# Patient Record
Sex: MALE | Race: WHITE | NOT HISPANIC OR LATINO | Employment: FULL TIME | URBAN - METROPOLITAN AREA
[De-identification: names, ages, dates, MRNs, and addresses within clinical notes are randomized per-mention and may not be internally consistent; named-entity substitution may affect disease eponyms.]

---

## 2018-04-15 ENCOUNTER — HOSPITAL ENCOUNTER (EMERGENCY)
Facility: HOSPITAL | Age: 23
Discharge: HOME | End: 2018-04-15
Attending: EMERGENCY MEDICINE | Admitting: EMERGENCY MEDICINE

## 2018-04-15 VITALS
BODY MASS INDEX: 25.18 KG/M2 | HEIGHT: 69 IN | SYSTOLIC BLOOD PRESSURE: 137 MMHG | WEIGHT: 170 LBS | HEART RATE: 70 BPM | RESPIRATION RATE: 20 BRPM | TEMPERATURE: 96.2 F | OXYGEN SATURATION: 97 % | DIASTOLIC BLOOD PRESSURE: 91 MMHG

## 2018-04-15 DIAGNOSIS — F10.10 ALCOHOL ABUSE: Primary | ICD-10-CM

## 2018-04-15 LAB
ALBUMIN SERPL BCP-MCNC: 4.5 G/DL (ref 3.5–5)
ALP SERPL-CCNC: 69 U/L (ref 46–116)
ALT SERPL W P-5'-P-CCNC: 30 U/L (ref 12–78)
ANION GAP SERPL CALCULATED.3IONS-SCNC: 13 MMOL/L (ref 4–13)
AST SERPL W P-5'-P-CCNC: 22 U/L (ref 5–45)
BASOPHILS # BLD AUTO: 0.1 THOUSANDS/ΜL (ref 0–0.1)
BASOPHILS NFR BLD AUTO: 0 % (ref 0–1)
BILIRUB SERPL-MCNC: 0.4 MG/DL (ref 0.2–1)
BUN SERPL-MCNC: 14 MG/DL (ref 5–25)
CALCIUM SERPL-MCNC: 9.1 MG/DL
CHLORIDE SERPL-SCNC: 103 MMOL/L (ref 100–108)
CO2 SERPL-SCNC: 24 MMOL/L (ref 21–32)
CREAT SERPL-MCNC: 0.98 MG/DL (ref 0.6–1.3)
EOSINOPHIL # BLD AUTO: 0 THOUSAND/ΜL (ref 0–0.61)
EOSINOPHIL NFR BLD AUTO: 0 % (ref 0–6)
ERYTHROCYTE [DISTWIDTH] IN BLOOD BY AUTOMATED COUNT: 13.6 % (ref 11.6–15.1)
GFR SERPL CREATININE-BSD FRML MDRD: 107 ML/MIN/1.73SQ M
GLUCOSE SERPL-MCNC: 126 MG/DL (ref 65–140)
HCT VFR BLD AUTO: 47 % (ref 42–52)
HGB BLD-MCNC: 15.4 G/DL (ref 14–18)
LIPASE SERPL-CCNC: 72 U/L (ref 73–393)
LYMPHOCYTES # BLD AUTO: 2.2 THOUSANDS/ΜL (ref 0.6–4.47)
LYMPHOCYTES NFR BLD AUTO: 12 % (ref 14–44)
MCH RBC QN AUTO: 31.6 PG (ref 27–31)
MCHC RBC AUTO-ENTMCNC: 32.8 G/DL (ref 31.4–37.4)
MCV RBC AUTO: 96 FL (ref 82–98)
MONOCYTES # BLD AUTO: 1 THOUSAND/ΜL (ref 0.17–1.22)
MONOCYTES NFR BLD AUTO: 5 % (ref 4–12)
NEUTROPHILS # BLD AUTO: 16 THOUSANDS/ΜL (ref 1.85–7.62)
NEUTS SEG NFR BLD AUTO: 83 % (ref 43–75)
NRBC BLD AUTO-RTO: 0 /100 WBCS
PLATELET # BLD AUTO: 270 THOUSANDS/UL (ref 130–400)
PLATELET BLD QL SMEAR: ADEQUATE
PMV BLD AUTO: 8.8 FL (ref 8.9–12.7)
POTASSIUM SERPL-SCNC: 3.7 MMOL/L (ref 3.5–5.3)
PROT SERPL-MCNC: 7.5 G/DL (ref 6.4–8.2)
RBC # BLD AUTO: 4.88 MILLION/UL (ref 4.7–6.1)
SODIUM SERPL-SCNC: 140 MMOL/L (ref 136–145)
WBC # BLD AUTO: 19.4 THOUSAND/UL (ref 4.8–10.8)

## 2018-04-15 PROCEDURE — 96366 THER/PROPH/DIAG IV INF ADDON: CPT

## 2018-04-15 PROCEDURE — 83690 ASSAY OF LIPASE: CPT | Performed by: PHYSICIAN ASSISTANT

## 2018-04-15 PROCEDURE — C9113 INJ PANTOPRAZOLE SODIUM, VIA: HCPCS | Performed by: EMERGENCY MEDICINE

## 2018-04-15 PROCEDURE — 96361 HYDRATE IV INFUSION ADD-ON: CPT

## 2018-04-15 PROCEDURE — 80053 COMPREHEN METABOLIC PANEL: CPT | Performed by: PHYSICIAN ASSISTANT

## 2018-04-15 PROCEDURE — 96365 THER/PROPH/DIAG IV INF INIT: CPT

## 2018-04-15 PROCEDURE — 96375 TX/PRO/DX INJ NEW DRUG ADDON: CPT

## 2018-04-15 PROCEDURE — 36415 COLL VENOUS BLD VENIPUNCTURE: CPT | Performed by: PHYSICIAN ASSISTANT

## 2018-04-15 PROCEDURE — 85025 COMPLETE CBC W/AUTO DIFF WBC: CPT | Performed by: PHYSICIAN ASSISTANT

## 2018-04-15 PROCEDURE — 99283 EMERGENCY DEPT VISIT LOW MDM: CPT

## 2018-04-15 PROCEDURE — 96368 THER/DIAG CONCURRENT INF: CPT

## 2018-04-15 RX ORDER — ONDANSETRON 2 MG/ML
4 INJECTION INTRAMUSCULAR; INTRAVENOUS ONCE
Status: COMPLETED | OUTPATIENT
Start: 2018-04-15 | End: 2018-04-15

## 2018-04-15 RX ORDER — ONDANSETRON 4 MG/1
4 TABLET, ORALLY DISINTEGRATING ORAL EVERY 6 HOURS PRN
Qty: 20 TABLET | Refills: 0 | Status: SHIPPED | OUTPATIENT
Start: 2018-04-15 | End: 2018-06-02

## 2018-04-15 RX ORDER — MAGNESIUM SULFATE HEPTAHYDRATE 40 MG/ML
2 INJECTION, SOLUTION INTRAVENOUS ONCE
Status: COMPLETED | OUTPATIENT
Start: 2018-04-15 | End: 2018-04-15

## 2018-04-15 RX ORDER — PANTOPRAZOLE SODIUM 40 MG/1
40 INJECTION, POWDER, FOR SOLUTION INTRAVENOUS ONCE
Status: COMPLETED | OUTPATIENT
Start: 2018-04-15 | End: 2018-04-15

## 2018-04-15 RX ORDER — KETOROLAC TROMETHAMINE 30 MG/ML
30 INJECTION, SOLUTION INTRAMUSCULAR; INTRAVENOUS ONCE
Status: COMPLETED | OUTPATIENT
Start: 2018-04-15 | End: 2018-04-15

## 2018-04-15 RX ADMIN — SODIUM CHLORIDE 1000 ML: 0.9 INJECTION, SOLUTION INTRAVENOUS at 11:19

## 2018-04-15 RX ADMIN — FOLIC ACID 1 MG: 5 INJECTION, SOLUTION INTRAMUSCULAR; INTRAVENOUS; SUBCUTANEOUS at 10:00

## 2018-04-15 RX ADMIN — MAGNESIUM SULFATE HEPTAHYDRATE 2 G: 40 INJECTION, SOLUTION INTRAVENOUS at 09:36

## 2018-04-15 RX ADMIN — PANTOPRAZOLE SODIUM 40 MG: 40 INJECTION, POWDER, FOR SOLUTION INTRAVENOUS at 09:31

## 2018-04-15 RX ADMIN — ONDANSETRON 4 MG: 2 INJECTION INTRAMUSCULAR; INTRAVENOUS at 09:06

## 2018-04-15 RX ADMIN — KETOROLAC TROMETHAMINE 30 MG: 30 INJECTION, SOLUTION INTRAMUSCULAR at 11:18

## 2018-04-15 RX ADMIN — ONDANSETRON 4 MG: 2 INJECTION INTRAMUSCULAR; INTRAVENOUS at 09:08

## 2018-04-15 RX ADMIN — SODIUM CHLORIDE 1000 ML: 0.9 INJECTION, SOLUTION INTRAVENOUS at 09:05

## 2018-04-15 NOTE — ED NOTES
Pt reports nausea and is vomiting after sticking his finger in his throat       Mario Jenkins RN  04/15/18 3102

## 2018-04-15 NOTE — DISCHARGE INSTRUCTIONS
Please take your nausea medication Zofran 30 minutes prior to eating or drinking  Please increase fluid intake as well such as Gatorade  Abuse of Alcohol   AMBULATORY CARE:   Alcohol abuse   · is unhealthy drinking behavior  You may drink too much at one time once a week, or continue to drink too much daily  You continue to drink even though it causes problems  The problems can be alcohol related legal problems or problems with work or family  · If you drink too much at one time, you are binge drinking  Binge drinking is when you have a large amount of alcohol in a short time  Your blood alcohol concentrations (KASH) goes above 0 08 g/dLlevel during binge drinking  For men, this usually happens with more than 4 drinks in 2 hours  For women, it is more than 3 drinks in 2 hours  A drink is 12 ounces of beer, 4 ounces of wine, or 1½ ounces of liquor  Common signs and symptoms of alcohol abuse include:  Each person that abuses alcohol may have different symptoms  The following are common signs and symptoms of alcohol abuse:  · Loss of interest in activities, work, and school    · Decreased interest in family and friends    · Depression    · Constant thoughts about drinking    · Not able to control the amount you drink    · Restlessness, or erratic and violent behavior  Call 911 for any of the following:   · You have sudden chest pain or trouble breathing  · You have a seizure or have shaking or trembling  · You feel like you could harm yourself or others  · You were in an accident because of alcohol  Seek care immediately if:   · You have hallucinations (you see or hear things that are not real)  · You cannot stop vomiting or you vomit blood  Contact your healthcare provider if:   · You need help to stop drinking alcohol  · You have questions or concerns about your condition or care    Long-term effects of alcohol abuse:   · Blackouts    · Memory loss    · Dementia    · Liver disease    · Thiamine (vitamin B1) deficiency  Treatment for alcohol abuse  may include the following:  · Detoxification (detox) and withdrawal  is a program that helps you to safely get alcohol out of your body  Detox can also help get rid of the physical need to drink  Healthcare providers monitor the physical symptoms of withdrawal  They may give you medicines to help decrease nausea, dehydration, and seizures  Healthcare providers will also monitor your blood pressure, heart and breathing rates, and your temperature  Symptoms of anxiety, depression, and suicidal thoughts are also monitored and managed during detox  Healthcare providers may give you medicines for these symptoms and therapy sessions will be available to you  Detox is usually done at a detox center or in a hospital  Healthcare providers do not recommend that you try to detox at home or by yourself  Withdrawal symptoms may become life threatening  The center can help you find 12 step programs or an individual therapist to help with emotional support after detox  · Inpatient and outpatient treatment  focus on your personal needs to help you stop drinking  Treatment helps you understand the reasons you abuse alcohol  Counselors and therapists provide you with support and help you find ways to cope instead of drinking  You may need inpatient treatment to provide a controlled environment  You may need outpatient treatment after your inpatient treatment is complete  · Alcohol aversion therapy  takes away the desire to drink by causing a negative reaction when you drink  Healthcare providers may give you medicines that cause nausea and vomiting when you drink alcohol  They may instead give you a medicine that decreases your urge to drink alcohol  These medicines are used to help you stop drinking or reduce the amount you drink  They can also help you avoid relapse    Risks of alcohol abuse:  Alcohol abuse increases your risk for gastrointestinal cancers, brain damage and problems with your immune system  It also increases your risk for heart, kidney, and lung damage  The risk of stroke increases with alcohol abuse  If you are pregnant and drink alcohol, you and your baby are at risk for serious health problems  Avoid alcohol:  You should stop drinking entirely  Alcohol can damage your brain, heart, and liver  It also increases your risk for injury, high blood pressure, and certain types of cancer  Alcohol is dangerous when you combine it with certain medicines  Do not drive if you drink alcohol:  Make sure someone who has not been drinking can help you get home  Get support:  Most people need support to stop drinking alcohol  Mental health providers, support groups, rehabilitation centers, and your healthcare provider can provide support  For more information:   · Alcoholics Anonymous  Web Address: http://[employer]/  · Substance Abuse and [address] [place]  Web Address: https://[employer]/  Follow up with your healthcare provider as directed:  Write down your questions so you remember to ask them during your visits  © 2017 [address] Information is for End User's use only and may not be sold, redistributed or otherwise used for commercial purposes  All illustrations and images included in CareNotes® are the copyrighted property of [employer] M [employer]  or Clarence Castro  The above information is an  only  It is not intended as medical advice for individual conditions or treatments  Talk to your doctor, nurse or pharmacist before following any medical regimen to see if it is safe and effective for you

## 2018-04-15 NOTE — ED PROVIDER NOTES
History  Chief Complaint   Patient presents with    Vomiting     per pt & friends at bedside, pt spent night of heavy drinking "all kinds of stuff"  woke at 7 this am with vomiting  22 y/o male presenting with nausea and vomiting that began last night into this morning after drinking "a lot" of alcohol  Here with friends who relay he also was smoking marijauna however no other illicit substances  Unsure how much however friends relay he drank hard liquor, and a large amount of fireball  Denies hematemasis, chest pain, shortness of breath, wheezing, diarrhea  None       Past Medical History:   Diagnosis Date    ETOH abuse        Past Surgical History:   Procedure Laterality Date    APPENDECTOMY      KNEE SURGERY      x 4       History reviewed  No pertinent family history  I have reviewed and agree with the history as documented  Social History   Substance Use Topics    Smoking status: Current Every Day Smoker    Smokeless tobacco: Never Used    Alcohol use Yes        Review of Systems   Constitutional: Negative  HENT: Negative  Eyes: Negative  Respiratory: Negative  Cardiovascular: Negative  Gastrointestinal: Positive for nausea and vomiting  Negative for abdominal distention, abdominal pain, anal bleeding, blood in stool, constipation, diarrhea and rectal pain  Genitourinary: Negative  Musculoskeletal: Negative  Skin: Negative  Neurological: Negative  All other systems reviewed and are negative        Physical Exam  ED Triage Vitals   Temperature Pulse Respirations Blood Pressure SpO2   04/15/18 0905 04/15/18 0905 04/15/18 0905 04/15/18 0936 04/15/18 0905   (!) 96 2 °F (35 7 °C) 70 20 142/75 97 %      Temp Source Heart Rate Source Patient Position - Orthostatic VS BP Location FiO2 (%)   04/15/18 0905 04/15/18 0905 04/15/18 0936 04/15/18 0936 --   Tympanic Monitor Sitting Left arm       Pain Score       04/15/18 0905       Worst Possible Pain Orthostatic Vital Signs  Vitals:    04/15/18 0905 04/15/18 0936 04/15/18 1207   BP:  142/75 137/91   Pulse: 70     Patient Position - Orthostatic VS:  Sitting        Physical Exam   Constitutional: He is oriented to person, place, and time  He appears well-developed and well-nourished  Actively vomiting  HENT:   Head: Normocephalic and atraumatic  Eyes: Conjunctivae are normal    Neck: Normal range of motion  Neck supple  Cardiovascular: Normal rate, regular rhythm, normal heart sounds and intact distal pulses  Pulmonary/Chest: Effort normal and breath sounds normal  No respiratory distress  He has no wheezes  He has no rales  He exhibits no tenderness  spo2 is 97% indicating adequate oxygenation  Abdominal: Soft  Bowel sounds are normal  He exhibits no distension and no mass  There is no tenderness  There is no rebound and no guarding  No hernia  Actively vomiting, no blood in the vomit  Neurological: He is alert and oriented to person, place, and time  Skin: Skin is warm and dry  Nursing note and vitals reviewed        ED Medications  Medications   sodium chloride 0 9 % bolus 1,000 mL (0 mL Intravenous Stopped 4/15/18 1119)   ondansetron (ZOFRAN) injection 4 mg (4 mg Intravenous Given 8/59/06 0925)   folic acid 1 mg, thiamine (VITAMIN B1) 100 mg in sodium chloride 0 9 % 50 mL IVPB (0 mg Intravenous Stopped 4/15/18 1120)   magnesium sulfate 2 g/50 mL IVPB (premix) 2 g (0 g Intravenous Stopped 4/15/18 1207)   pantoprazole (PROTONIX) injection 40 mg (40 mg Intravenous Given 4/15/18 0931)   ondansetron (ZOFRAN) injection 4 mg (4 mg Intravenous Given 4/15/18 0906)   ketorolac (TORADOL) injection 30 mg (30 mg Intravenous Given 4/15/18 1118)   sodium chloride 0 9 % bolus 1,000 mL (0 mL Intravenous Stopped 4/15/18 1146)       Diagnostic Studies  Results Reviewed     Procedure Component Value Units Date/Time    Comprehensive metabolic panel [[id number]] Collected:  04/15/18 0915    Lab Status: Final result Specimen:  Blood from Arm, Right Updated:  04/15/18 0944     Sodium 140 mmol/L      Potassium 3 7 mmol/L      Chloride 103 mmol/L      CO2 24 mmol/L      Anion Gap 13 mmol/L      BUN 14 mg/dL      Creatinine 0 98 mg/dL      Glucose 126 mg/dL      Calcium 9 1 mg/dL      AST 22 U/L      ALT 30 U/L      Alkaline Phosphatase 69 U/L      Total Protein 7 5 g/dL      Albumin 4 5 g/dL      Total Bilirubin 0 40 mg/dL      eGFR 107 ml/min/1 73sq m     Narrative:         National Kidney Disease Education Program recommendations are as follows:  GFR calculation is accurate only with a steady state creatinine  Chronic Kidney disease less than 60 ml/min/1 73 sq  meters  Kidney failure less than 15 ml/min/1 73 sq  meters      Lipase [[id number]]  (Abnormal) Collected:  04/15/18 0915    Lab Status:  Final result Specimen:  Blood from Arm, Right Updated:  04/15/18 0944     Lipase 72 (L) u/L     CBC and differential [[id number]]  (Abnormal) Collected:  04/15/18 0915    Lab Status:  Final result Specimen:  Blood from Arm, Right Updated:  04/15/18 0940     WBC 19 40 (H) Thousand/uL      RBC 4 88 Million/uL      Hemoglobin 15 4 g/dL      Hematocrit 47 0 %      MCV 96 fL      MCH 31 6 (H) pg      MCHC 32 8 g/dL      RDW 13 6 %      MPV 8 8 (L) fL      Platelets 103 Thousands/uL      nRBC 0 /100 WBCs      Neutrophils Relative 83 (H) %      Lymphocytes Relative 12 (L) %      Monocytes Relative 5 %      Eosinophils Relative 0 %      Basophils Relative 0 %      Neutrophils Absolute 16 00 (H) Thousands/µL      Lymphocytes Absolute 2 20 Thousands/µL      Monocytes Absolute 1 00 Thousand/µL      Eosinophils Absolute 0 00 Thousand/µL      Basophils Absolute 0 10 Thousands/µL                  No orders to display              Procedures  Procedures       Phone Contacts  ED Phone Contact    ED Course  ED Course as of Apr 15 1608   Sun Apr 15, 2018   1126 Likely reactive   WBC: (!) 19 40   1143 Patient requesting to eat MDM  Number of Diagnoses or Management Options  Alcohol abuse:   Diagnosis management comments: Alcohol abuse and intoxication/ hangover  No longer vomiting after fluids, no hungry requesting something to eat  Proper education given  Patient is informed to return to the emergency department for worsening of symptoms and was given proper education regarding their diagnosis and symptoms  Otherwise the patient is informed to follow up with their primary care doctor for re-evaluation  The patient verbalizes understanding and agrees with above assessment and plan  All questions were answered  Please Note: Fluency Direct voice recognition software may have been used in the creation of this document  Wrong words or sound a like substitutions may have occurred due to the inherent limitations of the voice software  Amount and/or Complexity of Data Reviewed  Clinical lab tests: ordered and reviewed  Review and summarize past medical records: yes  Independent visualization of images, tracings, or specimens: yes      CritCare Time    Disposition  Final diagnoses:   Alcohol abuse     Time reflects when diagnosis was documented in both MDM as applicable and the Disposition within this note     Time User Action Codes Description Comment    4/15/2018 11:54 AM Jose A Dobson Add [F10 10] Alcohol abuse       ED Disposition     ED Disposition Condition Comment    Discharge  [facility] discharge to home/self care  Condition at discharge: Good        Follow-up Information     Follow up With Specialties Details Why Contact Info Additional [phone] Emergency Department Emergency Medicine Go to If symptoms worsen such as severe abdominal pain, blood in your vomit    [address] [facility]  [phone] [facility], [place], [place], [address]        Discharge Medication List as of 4/15/2018 11:55 AM      START taking these medications    Details   ondansetron (ZOFRAN-ODT) 4 mg disintegrating tablet Take 1 tablet (4 mg total) by mouth every 6 (six) hours as needed for nausea or vomiting, Starting Sun 4/15/2018, Print           No discharge procedures on file      ED Provider  Electronically Signed by           Seda Boo PA-C  04/15/18 6833

## 2018-06-02 ENCOUNTER — HOSPITAL ENCOUNTER (EMERGENCY)
Facility: HOSPITAL | Age: 23
Discharge: HOME | End: 2018-06-03
Attending: EMERGENCY MEDICINE | Admitting: EMERGENCY MEDICINE
Payer: COMMERCIAL

## 2018-06-02 ENCOUNTER — APPOINTMENT (EMERGENCY)
Dept: RADIOLOGY | Facility: HOSPITAL | Age: 23
End: 2018-06-02
Payer: COMMERCIAL

## 2018-06-02 VITALS
RESPIRATION RATE: 20 BRPM | OXYGEN SATURATION: 99 % | WEIGHT: 170 LBS | HEART RATE: 65 BPM | DIASTOLIC BLOOD PRESSURE: 76 MMHG | BODY MASS INDEX: 25.1 KG/M2 | TEMPERATURE: 98.6 F | SYSTOLIC BLOOD PRESSURE: 136 MMHG

## 2018-06-02 DIAGNOSIS — S29.011A MUSCLE STRAIN OF CHEST WALL, INITIAL ENCOUNTER: Primary | ICD-10-CM

## 2018-06-02 PROCEDURE — 71045 X-RAY EXAM CHEST 1 VIEW: CPT

## 2018-06-03 PROCEDURE — 99283 EMERGENCY DEPT VISIT LOW MDM: CPT

## 2018-06-03 RX ORDER — NAPROXEN 500 MG/1
500 TABLET ORAL ONCE
Status: COMPLETED | OUTPATIENT
Start: 2018-06-03 | End: 2018-06-03

## 2018-06-03 RX ORDER — TRAMADOL HYDROCHLORIDE 50 MG/1
100 TABLET ORAL ONCE
Status: COMPLETED | OUTPATIENT
Start: 2018-06-03 | End: 2018-06-03

## 2018-06-03 RX ORDER — NAPROXEN 500 MG/1
500 TABLET ORAL 2 TIMES DAILY WITH MEALS
Qty: 10 TABLET | Refills: 0 | Status: SHIPPED | OUTPATIENT
Start: 2018-06-03 | End: 2018-08-14

## 2018-06-03 RX ORDER — CYCLOBENZAPRINE HCL 10 MG
10 TABLET ORAL ONCE
Status: COMPLETED | OUTPATIENT
Start: 2018-06-03 | End: 2018-06-03

## 2018-06-03 RX ORDER — CYCLOBENZAPRINE HCL 10 MG
10 TABLET ORAL 3 TIMES DAILY PRN
Qty: 15 TABLET | Refills: 0 | Status: SHIPPED | OUTPATIENT
Start: 2018-06-03 | End: 2018-08-14

## 2018-06-03 RX ADMIN — CYCLOBENZAPRINE HYDROCHLORIDE 10 MG: 10 TABLET, FILM COATED ORAL at 00:10

## 2018-06-03 RX ADMIN — TRAMADOL HYDROCHLORIDE 100 MG: 50 TABLET, FILM COATED ORAL at 00:10

## 2018-06-03 RX ADMIN — NAPROXEN 500 MG: 500 TABLET ORAL at 00:10

## 2018-06-03 NOTE — DISCHARGE INSTRUCTIONS
Chest wall muscle/Rib Strain   WHAT YOU NEED TO KNOW:   A muscle or rib strain is a twist, pull, or tear of a muscle or tendon that connects a muscle to a bone  Signs of a strained muscle include bruising and swelling over the area, pain with movement, and loss of strength  DISCHARGE INSTRUCTIONS:   Return to the emergency department if:   · You suddenly cannot feel or move your injured muscle  Contact your healthcare provider if:   · Your pain and swelling worsen or do not go away  · You have questions or concerns about your condition or care  Medicines:   · NSAIDs  help decrease swelling and pain or fever       · Muscle relaxers  help decrease pain and muscle spasms  Follow up with your healthcare provider as directed: Your healthcare provider may suggest that you have a follow-up visit before you go back to your usual activity  Write down your questions so you remember to ask them during your visits  Self-care:   · 3 to 7 days after the injury:  Use Rest, Ice, Compression, and Elevation (RICE) to help stop bruising and decrease pain and swelling  ¨ Rest:  Rest your muscle to allow your injury to heal  When the pain decreases, begin normal, slow movements  For mild and moderate muscle strains, you should rest your muscles for about 2 days  However, if you have a severe muscle strain, you should rest for 10 to 14 days  You may need to use crutches to walk if your muscle strain is in your legs or lower body  ¨ Ice:  Put an ice pack on the injured area  Put a towel between the ice pack and your skin  Do not put the ice pack directly on your skin  You can use a package of frozen peas instead of an ice pack  ¨ Compression:  You may need to wrap an elastic bandage around the area to decrease swelling  It should be tight enough for you to feel support  Do not wrap it too tightly  ¨ Elevation:  Keep the injured muscle raised above your heart if possible   For example if you have a strain of your lower leg muscle, lie down and prop your leg up on pillows  This helps decrease pain and swelling  · 3 to 21 days after the injury:  Start to slowly and regularly exercise your muscle  This will help it heal  If you feel pain, decrease how hard you are exercising  · 1 to 6 weeks after the injury:  Stretch the injured muscle  Hold the stretch for about 30 seconds  Do this 4 times a day  You may stretch the muscle until you feel a slight pull  Stop stretching if you feel pain  · 2 weeks to 6 months after the injury:  The goal of this phase is to return to the activity you were doing before the injury happened, without hurting the muscle again  · 3 weeks to 6 months after the injury:  Keep stretching and strengthening your muscles to avoid injury  Slowly increase the time and distance that you exercise  You may have signs and symptoms of muscle strain 6 months after the injury, even if you do things to help it heal  In this case, you may need surgery on the muscle  © 2017 [facility] Information is for End User's use only and may not be sold, redistributed or otherwise used for commercial purposes  All illustrations and images included in CareNotes® are the copyrighted property of [employer] [address] [employer]  or Clarence Castro  The above information is an  only  It is not intended as medical advice for individual conditions or treatments  Talk to your doctor, nurse or pharmacist before following any medical regimen to see if it is safe and effective for you

## 2018-06-03 NOTE — ED PROVIDER NOTES
History  Chief Complaint   Patient presents with    Back Pain     states moved an air conditioner today and a set of quoit boards  no pain at that time  about 7pm coughed forcefully and suddenly developed back pain     22 yowm had been moving heavy stuff earlier today, had some vague pain in right upper back, then coughed this evening and pain is worse and spreading down and across front of chest   Worse with deep breath  No cough/cold symptoms  No fever  No vomiting or diarrhea  History provided by:  Patient   used: No    Back Pain   Associated symptoms: chest pain    Associated symptoms: no abdominal pain, no dysuria, no fever and no headaches        None       Past Medical History:   Diagnosis Date    ETOH abuse        Past Surgical History:   Procedure Laterality Date    APPENDECTOMY      KNEE SURGERY      x 4       History reviewed  No pertinent family history  I have reviewed and agree with the history as documented  Social History   Substance Use Topics    Smoking status: Current Every Day Smoker     Packs/day: 1 00    Smokeless tobacco: Never Used    Alcohol use Yes      Comment: rare        Review of Systems   Constitutional: Negative  Negative for chills and fever  HENT: Negative  Negative for congestion and sore throat  Eyes: Negative  Respiratory: Negative  Negative for cough and shortness of breath  Cardiovascular: Positive for chest pain  Negative for leg swelling  Gastrointestinal: Negative  Negative for abdominal pain, diarrhea, nausea and vomiting  Genitourinary: Negative  Negative for dysuria, flank pain and hematuria  Musculoskeletal: Positive for back pain  Negative for myalgias  Skin: Negative  Negative for rash and wound  Neurological: Negative  Negative for dizziness and headaches  Psychiatric/Behavioral: Negative  Negative for confusion and hallucinations  The patient is not nervous/anxious      All other systems reviewed and are negative  Physical Exam  Physical Exam   Constitutional: He appears well-developed and well-nourished  No distress  HENT:   Head: Normocephalic and atraumatic  Eyes: Conjunctivae are normal  Pupils are equal, round, and reactive to light  No scleral icterus  Neck: Normal range of motion  Neck supple  Cardiovascular: Normal rate, regular rhythm and normal heart sounds  No murmur heard  Pulmonary/Chest: Effort normal and breath sounds normal  No respiratory distress  He exhibits no tenderness  Abdominal: Soft  Bowel sounds are normal  He exhibits no distension  There is no tenderness  Musculoskeletal: Normal range of motion  He exhibits no edema, tenderness or deformity  Neurological: He is alert  No cranial nerve deficit  Skin: Skin is warm and dry  No rash noted  He is not diaphoretic  No erythema  No pallor  Psychiatric: He has a normal mood and affect  His behavior is normal    Nursing note and vitals reviewed        Vital Signs  ED Triage Vitals [06/02/18 2311]   Temperature Pulse Respirations Blood Pressure SpO2   98 6 °F (37 °C) 65 20 136/76 99 %      Temp Source Heart Rate Source Patient Position - Orthostatic VS BP Location FiO2 (%)   Tympanic Monitor Sitting Right arm --      Pain Score       8           Vitals:    06/02/18 2311   BP: 136/76   Pulse: 65   Patient Position - Orthostatic VS: Sitting       Visual Acuity      ED Medications  Medications   cyclobenzaprine (FLEXERIL) tablet 10 mg (10 mg Oral Given 6/3/18 0010)   traMADol (ULTRAM) tablet 100 mg (100 mg Oral Given 6/3/18 0010)   naproxen (NAPROSYN) tablet 500 mg (500 mg Oral Given 6/3/18 0010)       Diagnostic Studies  Results Reviewed     None                 XR chest portable    (Results Pending)              Procedures  Procedures       Phone Contacts  ED Phone Contact    ED Course                               MDM  Number of Diagnoses or Management Options  Muscle strain of chest wall, initial encounter:   Diagnosis management comments: No PTX seen on xray and vitals/exam are normal   Will treat for strained muscle pain with naprosyn and flexeril  Advised rest, follow up if worsening  Pt  Needs work note  CritCare Time    Disposition  Final diagnoses:   Muscle strain of chest wall, initial encounter     Time reflects when diagnosis was documented in both MDM as applicable and the Disposition within this note     Time User Action Codes Description Comment    0/1/5370 01:06 AM Himanshu Basil A Add [W88 900L] Muscle strain of chest wall, initial encounter       ED Disposition     ED Disposition Condition Comment    Discharge  [facility] discharge to home/self care  Condition at discharge: Stable        Follow-up Information     Follow up With Specialties Details Why Contact Info    your doctor  Schedule an appointment as soon as possible for a visit As needed           Patient's Medications   Discharge Prescriptions    CYCLOBENZAPRINE (FLEXERIL) 10 MG TABLET    Take 1 tablet (10 mg total) by mouth 3 (three) times a day as needed for muscle spasms for up to 10 days       Start Date: 6/3/2018  End Date: 6/13/2018       Order Dose: 10 mg       Quantity: 15 tablet    Refills: 0    NAPROXEN (NAPROSYN) 500 MG TABLET    Take 1 tablet (500 mg total) by mouth 2 (two) times a day with meals       Start Date: 6/3/2018  End Date: --       Order Dose: 500 mg       Quantity: 10 tablet    Refills: 0     No discharge procedures on file      ED Provider  Electronically Signed by           Kay Narayanan MD  55/37/87 [id number]       Kay Narayanan MD  83/54/33 [id number]

## 2018-08-13 ENCOUNTER — HOSPITAL ENCOUNTER (OUTPATIENT)
Facility: HOSPITAL | Age: 23
Setting detail: OBSERVATION
Discharge: LEFT BEFORE DISCHARGE | End: 2018-08-14
Attending: EMERGENCY MEDICINE | Admitting: STUDENT IN AN ORGANIZED HEALTH CARE EDUCATION/TRAINING PROGRAM
Payer: COMMERCIAL

## 2018-08-13 DIAGNOSIS — R10.9 ABDOMINAL PAIN: Primary | ICD-10-CM

## 2018-08-13 DIAGNOSIS — K52.9 GASTROENTERITIS: ICD-10-CM

## 2018-08-13 DIAGNOSIS — R11.2 INTRACTABLE VOMITING WITH NAUSEA, UNSPECIFIED VOMITING TYPE: ICD-10-CM

## 2018-08-13 DIAGNOSIS — R11.10 INTRACTABLE VOMITING: ICD-10-CM

## 2018-08-13 DIAGNOSIS — R19.7 VOMITING AND DIARRHEA: ICD-10-CM

## 2018-08-13 DIAGNOSIS — R10.9 INTRACTABLE ABDOMINAL PAIN: ICD-10-CM

## 2018-08-13 DIAGNOSIS — R11.10 VOMITING AND DIARRHEA: ICD-10-CM

## 2018-08-14 ENCOUNTER — APPOINTMENT (EMERGENCY)
Dept: RADIOLOGY | Facility: HOSPITAL | Age: 23
End: 2018-08-14
Payer: COMMERCIAL

## 2018-08-14 VITALS
RESPIRATION RATE: 19 BRPM | WEIGHT: 177.4 LBS | OXYGEN SATURATION: 99 % | SYSTOLIC BLOOD PRESSURE: 104 MMHG | BODY MASS INDEX: 24.84 KG/M2 | DIASTOLIC BLOOD PRESSURE: 58 MMHG | HEART RATE: 80 BPM | TEMPERATURE: 99.2 F | HEIGHT: 71 IN

## 2018-08-14 PROBLEM — R11.2 INTRACTABLE NAUSEA AND VOMITING: Status: ACTIVE | Noted: 2018-08-14

## 2018-08-14 PROBLEM — F12.20 MARIJUANA DEPENDENCE (HCC): Status: ACTIVE | Noted: 2018-08-14

## 2018-08-14 PROBLEM — R65.10 SIRS (SYSTEMIC INFLAMMATORY RESPONSE SYNDROME) (HCC): Status: ACTIVE | Noted: 2018-08-14

## 2018-08-14 PROBLEM — A09 INFECTIOUS GASTROENTERITIS: Status: ACTIVE | Noted: 2018-08-14

## 2018-08-14 LAB
ALBUMIN SERPL BCP-MCNC: 4.1 G/DL (ref 3.5–5)
ALP SERPL-CCNC: 70 U/L (ref 46–116)
ALT SERPL W P-5'-P-CCNC: 19 U/L (ref 12–78)
AMPHETAMINES SERPL QL SCN: NEGATIVE
ANION GAP SERPL CALCULATED.3IONS-SCNC: 11 MMOL/L (ref 4–13)
ANION GAP SERPL CALCULATED.3IONS-SCNC: 8 MMOL/L (ref 4–13)
AST SERPL W P-5'-P-CCNC: 18 U/L (ref 5–45)
BARBITURATES UR QL: NEGATIVE
BASOPHILS # BLD AUTO: 0.04 THOUSANDS/ΜL (ref 0–0.1)
BASOPHILS # BLD AUTO: 0.06 THOUSANDS/ΜL (ref 0–0.1)
BASOPHILS NFR BLD AUTO: 0 % (ref 0–1)
BASOPHILS NFR BLD AUTO: 0 % (ref 0–1)
BENZODIAZ UR QL: NEGATIVE
BILIRUB SERPL-MCNC: 0.2 MG/DL (ref 0.2–1)
BUN SERPL-MCNC: 11 MG/DL (ref 5–25)
BUN SERPL-MCNC: 13 MG/DL (ref 5–25)
CALCIUM SERPL-MCNC: 8 MG/DL (ref 8.3–10.1)
CALCIUM SERPL-MCNC: 8.8 MG/DL (ref 8.3–10.1)
CHLORIDE SERPL-SCNC: 106 MMOL/L (ref 100–108)
CHLORIDE SERPL-SCNC: 107 MMOL/L (ref 100–108)
CO2 SERPL-SCNC: 24 MMOL/L (ref 21–32)
CO2 SERPL-SCNC: 27 MMOL/L (ref 21–32)
COCAINE UR QL: NEGATIVE
CREAT SERPL-MCNC: 0.85 MG/DL (ref 0.6–1.3)
CREAT SERPL-MCNC: 1.01 MG/DL (ref 0.6–1.3)
EOSINOPHIL # BLD AUTO: 0 THOUSAND/ΜL (ref 0–0.61)
EOSINOPHIL # BLD AUTO: 0.14 THOUSAND/ΜL (ref 0–0.61)
EOSINOPHIL NFR BLD AUTO: 0 % (ref 0–6)
EOSINOPHIL NFR BLD AUTO: 1 % (ref 0–6)
ERYTHROCYTE [DISTWIDTH] IN BLOOD BY AUTOMATED COUNT: 12.5 % (ref 11.6–15.1)
ERYTHROCYTE [DISTWIDTH] IN BLOOD BY AUTOMATED COUNT: 12.6 % (ref 11.6–15.1)
ETHANOL SERPL-MCNC: <3 MG/DL (ref 0–3)
GFR SERPL CREATININE-BSD FRML MDRD: 103 ML/MIN/1.73SQ M
GFR SERPL CREATININE-BSD FRML MDRD: 121 ML/MIN/1.73SQ M
GLUCOSE P FAST SERPL-MCNC: 117 MG/DL (ref 65–99)
GLUCOSE SERPL-MCNC: 117 MG/DL (ref 65–140)
GLUCOSE SERPL-MCNC: 118 MG/DL (ref 65–140)
HCT VFR BLD AUTO: 39.6 % (ref 36.5–49.3)
HCT VFR BLD AUTO: 45.9 % (ref 36.5–49.3)
HGB BLD-MCNC: 12.9 G/DL (ref 12–17)
HGB BLD-MCNC: 15 G/DL (ref 12–17)
IMM GRANULOCYTES # BLD AUTO: 0.07 THOUSAND/UL (ref 0–0.2)
IMM GRANULOCYTES # BLD AUTO: 0.1 THOUSAND/UL (ref 0–0.2)
IMM GRANULOCYTES NFR BLD AUTO: 0 % (ref 0–2)
IMM GRANULOCYTES NFR BLD AUTO: 1 % (ref 0–2)
LIPASE SERPL-CCNC: 101 U/L (ref 73–393)
LYMPHOCYTES # BLD AUTO: 0.55 THOUSANDS/ΜL (ref 0.6–4.47)
LYMPHOCYTES # BLD AUTO: 3.1 THOUSANDS/ΜL (ref 0.6–4.47)
LYMPHOCYTES NFR BLD AUTO: 15 % (ref 14–44)
LYMPHOCYTES NFR BLD AUTO: 3 % (ref 14–44)
MCH RBC QN AUTO: 31.6 PG (ref 26.8–34.3)
MCH RBC QN AUTO: 31.7 PG (ref 26.8–34.3)
MCHC RBC AUTO-ENTMCNC: 32.6 G/DL (ref 31.4–37.4)
MCHC RBC AUTO-ENTMCNC: 32.7 G/DL (ref 31.4–37.4)
MCV RBC AUTO: 97 FL (ref 82–98)
MCV RBC AUTO: 97 FL (ref 82–98)
METHADONE UR QL: NEGATIVE
MONOCYTES # BLD AUTO: 0.51 THOUSAND/ΜL (ref 0.17–1.22)
MONOCYTES # BLD AUTO: 1.19 THOUSAND/ΜL (ref 0.17–1.22)
MONOCYTES NFR BLD AUTO: 3 % (ref 4–12)
MONOCYTES NFR BLD AUTO: 6 % (ref 4–12)
NEUTROPHILS # BLD AUTO: 16.01 THOUSANDS/ΜL (ref 1.85–7.62)
NEUTROPHILS # BLD AUTO: 17.52 THOUSANDS/ΜL (ref 1.85–7.62)
NEUTS SEG NFR BLD AUTO: 78 % (ref 43–75)
NEUTS SEG NFR BLD AUTO: 93 % (ref 43–75)
NRBC BLD AUTO-RTO: 0 /100 WBCS
NRBC BLD AUTO-RTO: 0 /100 WBCS
OPIATES UR QL SCN: NEGATIVE
PCP UR QL: NEGATIVE
PLATELET # BLD AUTO: 253 THOUSANDS/UL (ref 149–390)
PLATELET # BLD AUTO: 282 THOUSANDS/UL (ref 149–390)
PMV BLD AUTO: 10.8 FL (ref 8.9–12.7)
PMV BLD AUTO: 11.4 FL (ref 8.9–12.7)
POTASSIUM SERPL-SCNC: 3.6 MMOL/L (ref 3.5–5.3)
POTASSIUM SERPL-SCNC: 4 MMOL/L (ref 3.5–5.3)
PROT SERPL-MCNC: 7.1 G/DL (ref 6.4–8.2)
RBC # BLD AUTO: 4.08 MILLION/UL (ref 3.88–5.62)
RBC # BLD AUTO: 4.73 MILLION/UL (ref 3.88–5.62)
SODIUM SERPL-SCNC: 141 MMOL/L (ref 136–145)
SODIUM SERPL-SCNC: 142 MMOL/L (ref 136–145)
THC UR QL: POSITIVE
WBC # BLD AUTO: 18.72 THOUSAND/UL (ref 4.31–10.16)
WBC # BLD AUTO: 20.57 THOUSAND/UL (ref 4.31–10.16)

## 2018-08-14 PROCEDURE — 96375 TX/PRO/DX INJ NEW DRUG ADDON: CPT

## 2018-08-14 PROCEDURE — 36415 COLL VENOUS BLD VENIPUNCTURE: CPT | Performed by: EMERGENCY MEDICINE

## 2018-08-14 PROCEDURE — 87081 CULTURE SCREEN ONLY: CPT | Performed by: STUDENT IN AN ORGANIZED HEALTH CARE EDUCATION/TRAINING PROGRAM

## 2018-08-14 PROCEDURE — 99219 PR INITIAL OBSERVATION CARE/DAY 50 MINUTES: CPT | Performed by: STUDENT IN AN ORGANIZED HEALTH CARE EDUCATION/TRAINING PROGRAM

## 2018-08-14 PROCEDURE — 85025 COMPLETE CBC W/AUTO DIFF WBC: CPT | Performed by: STUDENT IN AN ORGANIZED HEALTH CARE EDUCATION/TRAINING PROGRAM

## 2018-08-14 PROCEDURE — 83690 ASSAY OF LIPASE: CPT | Performed by: EMERGENCY MEDICINE

## 2018-08-14 PROCEDURE — 96376 TX/PRO/DX INJ SAME DRUG ADON: CPT

## 2018-08-14 PROCEDURE — 85025 COMPLETE CBC W/AUTO DIFF WBC: CPT | Performed by: EMERGENCY MEDICINE

## 2018-08-14 PROCEDURE — 74177 CT ABD & PELVIS W/CONTRAST: CPT

## 2018-08-14 PROCEDURE — 99284 EMERGENCY DEPT VISIT MOD MDM: CPT

## 2018-08-14 PROCEDURE — 80320 DRUG SCREEN QUANTALCOHOLS: CPT | Performed by: EMERGENCY MEDICINE

## 2018-08-14 PROCEDURE — 80307 DRUG TEST PRSMV CHEM ANLYZR: CPT | Performed by: NURSE PRACTITIONER

## 2018-08-14 PROCEDURE — 99204 OFFICE O/P NEW MOD 45 MIN: CPT | Performed by: PHYSICIAN ASSISTANT

## 2018-08-14 PROCEDURE — 80053 COMPREHEN METABOLIC PANEL: CPT | Performed by: EMERGENCY MEDICINE

## 2018-08-14 PROCEDURE — 96374 THER/PROPH/DIAG INJ IV PUSH: CPT

## 2018-08-14 PROCEDURE — 99217 PR OBSERVATION CARE DISCHARGE MANAGEMENT: CPT | Performed by: NURSE PRACTITIONER

## 2018-08-14 PROCEDURE — 96361 HYDRATE IV INFUSION ADD-ON: CPT

## 2018-08-14 PROCEDURE — 80048 BASIC METABOLIC PNL TOTAL CA: CPT | Performed by: STUDENT IN AN ORGANIZED HEALTH CARE EDUCATION/TRAINING PROGRAM

## 2018-08-14 RX ORDER — HEPARIN SODIUM 5000 [USP'U]/ML
5000 INJECTION, SOLUTION INTRAVENOUS; SUBCUTANEOUS EVERY 8 HOURS SCHEDULED
Status: DISCONTINUED | OUTPATIENT
Start: 2018-08-14 | End: 2018-08-14 | Stop reason: HOSPADM

## 2018-08-14 RX ORDER — ONDANSETRON 2 MG/ML
INJECTION INTRAMUSCULAR; INTRAVENOUS
Status: COMPLETED
Start: 2018-08-14 | End: 2018-08-14

## 2018-08-14 RX ORDER — ONDANSETRON 2 MG/ML
4 INJECTION INTRAMUSCULAR; INTRAVENOUS ONCE
Status: COMPLETED | OUTPATIENT
Start: 2018-08-14 | End: 2018-08-14

## 2018-08-14 RX ORDER — METOCLOPRAMIDE HYDROCHLORIDE 5 MG/ML
10 INJECTION INTRAMUSCULAR; INTRAVENOUS ONCE
Status: COMPLETED | OUTPATIENT
Start: 2018-08-14 | End: 2018-08-14

## 2018-08-14 RX ORDER — ONDANSETRON 2 MG/ML
4 INJECTION INTRAMUSCULAR; INTRAVENOUS EVERY 6 HOURS PRN
Status: DISCONTINUED | OUTPATIENT
Start: 2018-08-14 | End: 2018-08-14 | Stop reason: HOSPADM

## 2018-08-14 RX ORDER — SODIUM CHLORIDE 9 MG/ML
75 INJECTION, SOLUTION INTRAVENOUS CONTINUOUS
Status: DISCONTINUED | OUTPATIENT
Start: 2018-08-14 | End: 2018-08-14 | Stop reason: HOSPADM

## 2018-08-14 RX ORDER — KETOROLAC TROMETHAMINE 30 MG/ML
30 INJECTION, SOLUTION INTRAMUSCULAR; INTRAVENOUS ONCE
Status: COMPLETED | OUTPATIENT
Start: 2018-08-14 | End: 2018-08-14

## 2018-08-14 RX ORDER — PROMETHAZINE HYDROCHLORIDE 25 MG/ML
25 INJECTION, SOLUTION INTRAMUSCULAR; INTRAVENOUS ONCE
Status: COMPLETED | OUTPATIENT
Start: 2018-08-14 | End: 2018-08-14

## 2018-08-14 RX ORDER — LORAZEPAM 2 MG/ML
0.5 INJECTION INTRAMUSCULAR ONCE AS NEEDED
Status: COMPLETED | OUTPATIENT
Start: 2018-08-14 | End: 2018-08-14

## 2018-08-14 RX ADMIN — ONDANSETRON 4 MG: 2 INJECTION INTRAMUSCULAR; INTRAVENOUS at 00:17

## 2018-08-14 RX ADMIN — ONDANSETRON 4 MG: 2 INJECTION INTRAMUSCULAR; INTRAVENOUS at 01:49

## 2018-08-14 RX ADMIN — METOCLOPRAMIDE 10 MG: 5 INJECTION, SOLUTION INTRAMUSCULAR; INTRAVENOUS at 02:03

## 2018-08-14 RX ADMIN — ONDANSETRON 4 MG: 2 INJECTION INTRAMUSCULAR; INTRAVENOUS at 05:59

## 2018-08-14 RX ADMIN — SODIUM CHLORIDE 1000 ML: 0.9 INJECTION, SOLUTION INTRAVENOUS at 01:51

## 2018-08-14 RX ADMIN — SODIUM CHLORIDE 1000 ML: 0.9 INJECTION, SOLUTION INTRAVENOUS at 00:16

## 2018-08-14 RX ADMIN — PROMETHAZINE HYDROCHLORIDE 25 MG: 25 INJECTION INTRAMUSCULAR; INTRAVENOUS at 00:55

## 2018-08-14 RX ADMIN — HEPARIN SODIUM 5000 UNITS: 5000 INJECTION, SOLUTION INTRAVENOUS; SUBCUTANEOUS at 05:59

## 2018-08-14 RX ADMIN — SODIUM CHLORIDE 1000 ML: 0.9 INJECTION, SOLUTION INTRAVENOUS at 01:11

## 2018-08-14 RX ADMIN — SODIUM CHLORIDE 75 ML/HR: 0.9 INJECTION, SOLUTION INTRAVENOUS at 03:34

## 2018-08-14 RX ADMIN — IOHEXOL 100 ML: 350 INJECTION, SOLUTION INTRAVENOUS at 00:50

## 2018-08-14 RX ADMIN — KETOROLAC TROMETHAMINE 30 MG: 30 INJECTION, SOLUTION INTRAMUSCULAR at 00:17

## 2018-08-14 RX ADMIN — LORAZEPAM 0.5 MG: 2 INJECTION INTRAMUSCULAR; INTRAVENOUS at 05:52

## 2018-08-14 NOTE — PROGRESS NOTES
1400 Pt found by his mother in the parking lot today and pt said he just wanted to smoke  Pt instructed that he cannot do it with his IV in     1515 RN told me pt would like to leave  Spoke to pt and pt said he needs to go amd feels fine  Re-examine pt  Tolerated his lunch well, denies abdominal pain, N/V neither diarrhea  No rebound tenderness on palpation  Pt made aware of risk and benefits of signing out AMA  Pt verbalized understanding  18 gauge IV taken out per protocol

## 2018-08-14 NOTE — CASE MANAGEMENT
Initial Clinical Review    Admission: Date/Time/Statement: 8/14/18 0204    Orders Placed This Encounter   Procedures    Place in Observation     Standing Status:   Standing     Number of Occurrences:   1     Order Specific Question:   Admitting Physician     Answer:   Lorna Connors [[id number]]     Order Specific Question:   Level of Care     Answer:   Med Surg [16]         ED: Date/Time/Mode of Arrival:   ED Arrival Information     Expected Arrival Acuity Means of Arrival Escorted By Service Admission Type    - 8/13/2018 23:49 Urgent Walk-In Self General Medicine Urgent    Arrival Complaint    VOMITING          Chief Complaint:   Chief Complaint   Patient presents with    Vomiting     Patient states he started two hours ago with vomiting and diarrhea,no fever       History of Illness: Emmy Johnson is a 20 y o  male with no significant PMH who presents with abdominal pain, nausea, vomiting and diarrhea  He states that he was in his usual state of health until yesterday when he began to have episodes loose diarrhea and abdominal pain  He then began to have severe nausea and vomiting and has been unable to keep anything down since  He denies any recent alcohol use and denies any heavy alcohol intake  He came to the ED where CT imaging revealed possible enteritis  Currently he reports diffuse abdominal pain and nausea  He does not report any melena or blood in his stools    No other complaints or concerns         ED Vital Signs:   ED Triage Vitals   Temperature Pulse Respirations Blood Pressure SpO2   08/14/18 0000 08/14/18 0000 08/14/18 0000 08/14/18 0000 08/14/18 0000   (!) 96 6 °F (35 9 °C) 61 18 131/81 98 %      Temp Source Heart Rate Source Patient Position - Orthostatic VS BP Location FiO2 (%)   08/14/18 0000 08/14/18 0000 08/14/18 0000 08/14/18 0000 --   Tympanic Monitor Lying Left arm       Pain Score       08/14/18 0017       7        Wt Readings from Last 1 Encounters:   08/14/18 80 5 kg (177 lb 6 4 oz)       Vital Signs (abnormal): Abnormal Labs/Diagnostic Test Results: WBC 20 5718 72 GLUCOSE 117 CA 8 0   CT ABDOMEN PELVIS Fluid-filled distal small bowel and mild hyperenhancement of the small bowel wall is nonspecific however may be seen in the setting of enteritis      ED Treatment:   Medication Administration from 08/13/2018 2349 to 08/14/2018 0245       Date/Time Order Dose Route Action Action by Comments     08/14/2018 0110 sodium chloride 0 9 % bolus 1,000 mL 0 mL Intravenous Stopped Merline Ashok, RN      08/14/2018 0016 sodium chloride 0 9 % bolus 1,000 mL 1,000 mL Intravenous New Bag Merline Ashok, RN      08/14/2018 0017 ondansetron (ZOFRAN) injection 4 mg 4 mg Intravenous Given Merline Ashok, RN      08/14/2018 0017 ketorolac (TORADOL) injection 30 mg 30 mg Intravenous Given Merline Ashok, RN      08/14/2018 0055 promethazine (PHENERGAN) injection 25 mg 25 mg Intravenous Given Veldon Salines, RN      08/14/2018 0050 iohexol (OMNIPAQUE) 350 MG/ML injection (MULTI-DOSE) 100 mL 100 mL Intravenous Given Mellen Mally Chi      08/14/2018 0111 sodium chloride 0 9 % bolus 1,000 mL 1,000 mL Intravenous New Bag Merline Ashok, RN      08/14/2018 0149 ondansetron (ZOFRAN) injection 4 mg 4 mg Intravenous Given Veldon Salines, RN      08/14/2018 0151 sodium chloride 0 9 % bolus 1,000 mL 0 mL Intravenous Stopped Veldon Salines, RN      08/14/2018 0151 sodium chloride 0 9 % bolus 1,000 mL 1,000 mL Intravenous [address] Veldon Salines, RN      08/14/2018 0203 metoclopramide (REGLAN) injection 10 mg 10 mg Intravenous Given Veldon Salines, RN           Past Medical/Surgical History:    Active Ambulatory Problems     Diagnosis Date Noted    No Active Ambulatory Problems     Resolved Ambulatory Problems     Diagnosis Date Noted    No Resolved Ambulatory Problems     Past Medical History:   Diagnosis Date    ETOH abuse        Admitting Diagnosis: Gastroenteritis [K52 9]  Vomiting [R11 10]  Abdominal pain [R10 9]  Intractable vomiting [R11 10]  Vomiting and diarrhea [R11 10, R19 7]  Intractable abdominal pain [R10 9]  Intractable vomiting with nausea, unspecified vomiting type [R11 2]    Age/Sex: 20 y o  male    Assessment/Plan:   Intractable nausea/vomiting and abdominal pain likely secondary to Viral Gastroenteritis    Assessment & Plan     CT reveals possible enteritis  Alcohol level is negative  LFTs and lipase are unremarkable  Will admit to Medicine, keep NPO, start on IV NS, order Zofran prn, order stool studies and have GI evaluate the pt           SIRS (systemic inflammatory response syndrome) ([address] )   Assessment & Plan     Likely due to above  Will hydrate with IV NS, manage as above and repeat CBC later today          VTE Prophylaxis: Heparin   Code Status: No Order     Anticipated Length of Stay:  Patient will be admitted on an Observation basis with an anticipated length of stay of atleast 1 midnights       Admission Orders:  OBSERVATION  NPO SIPS WATER  STOOL FECAL LEUKOCYTES OVA PARASITE EX ENTERIC BACTERIAL PANEL BY PCR  CONSULT GI    Scheduled Meds:   Current Facility-Administered Medications:  heparin (porcine) 5,000 Units Subcutaneous Q8H Nilam Boucher MD    nicotine 1 patch Transdermal Daily Narcisa Marroquin MD    ondansetron 4 mg Intravenous Q6H PRN Narcisa Marroquin MD    sodium chloride 75 mL/hr Intravenous Continuous Narcisa Marroquin MD Last Rate: 75 mL/hr (08/14/18 0334)     Continuous Infusions:   sodium chloride 75 mL/hr Last Rate: 75 mL/hr (08/14/18 0334)     PRN Meds: ondansetron

## 2018-08-14 NOTE — DISCHARGE SUMMARY
Discharge- Taiwo Larger 1995, 20 y o  male MRN: [id number]    Unit/Bed#: [address] [place] [address] Encounter: [id number]    Primary Care Provider: No primary care provider on file  Date and time admitted to hospital: 8/13/2018 11:58 PM        * Infectious gastroenteritis   Assessment & Plan    CT reveals possible enteritis  Alcohol level is negative  LFTs and lipase are unremarkable  Admits to having family exposed to the same symptoms  Pt was admitted and  kept NPO, hydrated  Ordered stool studies but pt did not have any BM  GI consulted and evaluated pt  Appreciate input and recommendations  During his stay, he had no diarrhea/nausea and vomiting  On abdominal exam, he denies abdominal tenderness/rebound  He has hypoactive BS  He tolerated clear liquid diet with no vomiting and diarrhea here  Pt left AMA even after explaining risks and benefits  Pt verbalize understanding  SIRS (systemic inflammatory response syndrome) (HCC)   Assessment & Plan    Likely due to above  IV hydration was done  WBC remains elevated  Pt is afebrile, denies diarrhea, N/V  IV d/c, pt tolerated clear diets well  Left AMA despite discussing benefits of staying  Pt verbalizes understanding  Pt denies abd  pain on exam, darrhea, N/V  Marijuana dependence ([facility] [address] )   Assessment & Plan    UDS showed positive for THC  Pt admits to using marijuana as a habit  Long discussion with pt about marijuana in front of his mother  Advised to marijuana cessation  Discharging Physician / Practitioner: Anthony Salinas, Mal Koch  PCP: No primary care provider on file    Admission Date: 8/13/2018  Discharge Date: 08/14/18    Reason for Admission: Vomiting (Patient states he started two hours ago with vomiting and diarrhea,no fever)        Resolved Problems  Date Reviewed: 8/14/2018    None          Consultations During Hospital Stay:  IP CONSULT TO GASTROENTEROLOGY    Procedures Performed:     · None    Significant Findings / Test Results:     · Enteritis    Ct Abdomen Pelvis With Contrast    Result Date: 8/14/2018    Narrative: CT ABDOMEN AND PELVIS WITH IV CONTRAST INDICATION:   Abdominal pain, gastroenteritis or colitis suspected  COMPARISON:  CT of the abdomen and pelvis on January 12, 2012  FINDINGS: ABDOMEN LOWER CHEST:  No clinically significant abnormality identified in the visualized lower chest  LIVER/BILIARY TREE:  There is periportal edema  GALLBLADDER:  No calcified gallstones  No pericholecystic inflammatory change  SPLEEN:  Unremarkable  PANCREAS:  Unremarkable  ADRENAL GLANDS:  Unremarkable  KIDNEYS/URETERS:  Unremarkable  No hydronephrosis  STOMACH AND BOWEL:  Fluid-filled distal small bowel and mild hyperenhancement of the small bowel wall is nonspecific however may be seen in the setting of enteritis  There is no bowel obstruction  Unremarkable colon  APPENDIX:  Status post appendectomy  ABDOMINOPELVIC CAVITY:  No ascites or free intraperitoneal air  No lymphadenopathy  VESSELS:  Unremarkable for patient's age  PELVIS REPRODUCTIVE ORGANS:  Unremarkable for patient's age  URINARY BLADDER:  Unremarkable  ABDOMINAL WALL/INGUINAL REGIONS:  Unremarkable  OSSEOUS STRUCTURES:  No acute fracture or destructive osseous lesion  Impression: Fluid-filled distal small bowel and mild hyperenhancement of the small bowel wall is nonspecific however may be seen in the setting of enteritis  Incidental Findings:   · Enteritis    Test Results Pending at Discharge (will require follow up): · None     Outpatient Tests Requested:  · None    Complications:  None    Reason for Admission: Abdominal pain with vomiting    Hospital Course:     Carolynn Sun is a 20 y o  male patient with no significant history  who originally presented to the hospital on 8/13/2018 due to abdominal pain associated with vomiting and diarrhea   Pt was in his usual health state until yesterday when he began to have episodes of loose BM described it as watery associated with N/V  Pt denies to alcohol intake, he drinks only occasionally  He admits to taking marijuana as a habit  Family member also having the same symptom with him but not as bad  In the ED pt requires Lorazepam IV due to his restlessness  Pt said his abdominal pain was that serious  In the am of exam he was so lethargic, mother was at bedside  On rounds to do repeat abdominal exam pt not in the room and made RN aware  Pt was nowhere to be found  About to call security and pt seen walking in the hallway with his mother  Mother said she found him next to his car  Pt said he just went out to smoke  Pt expresses his desire to leave  He stated that he is better now  Explanation given to pt about risks and benefits of staying vs leaving  30 mins later pt told RN andrew the would like to leave  I spoke to his mother and pt  Pt verbalized understanding risk of of leaving the hospital  Pt re-examined  He denies abdominal tenderness/rebound  He has hypoactive BS  He tolerated clear liquid diet with no vomiting and diarrhea here  Pt left AMA even after explaining risks and benefits of staying vs leaving  Please see above list of diagnoses and related plan for additional information  Condition at Discharge: good     Discharge instructions/Information to patient and family:   See after visit summary for information provided to patient and family  Provisions for Follow-Up Care:  See after visit summary for information related to follow-up care and any pertinent home health orders  Disposition:     Home    Planned Readmission: None     Discharge Statement:  I spent 45 minutes discharging the patient  This time was spent on the day of discharge  I had direct contact with the patient on the day of discharge   Greater than 50% of the total time was spent examining patient, answering all patient questions, arranging and discussing plan of care with patient as well as directly providing post-discharge instructions  Additional time then spent on discharge activities  Discharge Medications:  See after visit summary for reconciled discharge medications provided to patient and family        ** Please Note: This note has been constructed using a voice recognition system **

## 2018-08-14 NOTE — NURSING NOTE
Patient met with NP and NP discussed AMA and deya still left AMA (AMA paper signed)  IV must removed

## 2018-08-14 NOTE — PROGRESS NOTES
Subjective:  Patient in the room, recently medicated with Lorazepam at 0600 secondary to restlessness per report  Unable to assess patient, responding to questions with one-word only, mother at bedside  Mother updated with patient's current status  Per mother, patient lives alone, has a girlfriend not living with his girlfriend, has 3 children  Unaware of alcohol or drug intake  Objective:  LCTA, HR- S1 S2 noted  Positive pain right/left lower quadrant on palpation, hypoactive bowel sounds, non-distended      Plan:  Continue IV fluids, anti emetic, monitor, UDS, GI consult

## 2018-08-14 NOTE — ASSESSMENT & PLAN NOTE
UDS showed positive for THC  Pt admits to using marijuana as a habit  Long discussion with pt about marijuana in front of his mother  Advised to marijuana cessation

## 2018-08-14 NOTE — CONSULTS
Consultation - [facility]  Abilio Genao 20 y o  male MRN: [id number]  Unit/Bed#: [address] [place] [address] Encounter: [id number]        Consults    Reason for Consult / Principal Problem: Intractable nausea and vomiting    HPI: Abilio Genao is a 20y o  year old male with history of alcohol abuse who came to the ER yesterday with complaints of about 1 day of nausea/vomiting/diarrhea and abdominal pain which began rather suddenly  He said he had some similar vomiting episodes in the past which were attributed to what sounds like alcoholic gastritis, although he says that lately he only drinks about two beers a week on average  He says that his son and mother both recently came down with illnesses associated with nausea, vomiting and diarrhea  He does not recall sharing any meals/foods with these individuals  Denies recent travel or ABX use  His CT scan showed evidence of enteritis, and WBC count was elevated at 21 on arrival   He denies any subjective fevers or chills at this time  He says he feels very tired  He has never had EGD or colonoscopy before, and denies any known family history of colon CA or IBD  No diarrhea since coming to the hospital   He denies any hematemesis, rectal bleeding or melena  REVIEW OF SYSTEMS:    CONSTITUTIONAL: Denies any fever, chills, or rigors  Good appetite, and no recent weight loss  HEENT: No earache or tinnitus  Denies hearing loss or visual disturbances  CARDIOVASCULAR: No chest pain or palpitations  RESPIRATORY: Denies any cough, hemoptysis, shortness of breath or dyspnea on exertion  GASTROINTESTINAL: As noted in the History of Present Illness  GENITOURINARY: No problems with urination  Denies any hematuria or dysuria  NEUROLOGIC: No dizziness or vertigo, denies headaches  MUSCULOSKELETAL: Denies any muscle or joint pain  SKIN: Denies skin rashes or itching     ENDOCRINE: Denies excessive thirst  Denies intolerance to heat or cold   PSYCHOSOCIAL: Denies depression or anxiety  Denies any recent memory loss  Historical Information   Past Medical History:   Diagnosis Date    ETOH abuse      Past Surgical History:   Procedure Laterality Date    APPENDECTOMY      KNEE SURGERY      x 4     Social History   History   Alcohol Use    Yes     Comment: "monthly" per patient     History   Drug Use    Frequency: 21 0 times per week    Types: Marijuana     Comment: smoked last night     History   Smoking Status    Current Every Day Smoker    Packs/day: 1 00   Smokeless Tobacco    Never Used     Family History   Problem Relation Age of Onset    No Known Problems Mother     Hypertension Maternal Grandmother     Hypertension Maternal Grandfather     Hypertension Paternal Grandmother     Hypertension Paternal Grandfather        Meds/Allergies     No prescriptions prior to admission  Current Facility-Administered Medications   Medication Dose Route Frequency    heparin (porcine) subcutaneous injection 5,000 Units  5,000 Units Subcutaneous Q8H [address]    nicotine (NICODERM CQ) 7 mg/24hr TD 24 hr patch 1 patch  1 patch Transdermal Daily    ondansetron (ZOFRAN) injection 4 mg  4 mg Intravenous Q6H PRN    sodium chloride 0 9 % infusion  75 mL/hr Intravenous Continuous       No Known Allergies        Objective     Blood pressure 131/81, pulse 77, temperature 97 8 °F (36 6 °C), temperature source Oral, resp  rate 20, height 5' 11" (1 803 m), weight 80 5 kg (177 lb 6 4 oz), SpO2 97 %        Intake/Output Summary (Last 24 hours) at 08/14/18 1107  Last data filed at 08/14/18 0600   Gross per 24 hour   Intake           2182 5 ml   Output              440 ml   Net           1742 5 ml         PHYSICAL EXAM     General Appearance:   Alert, cooperative, no distress, appears stated age    HEENT:   Normocephalic, atraumatic, anicteric      Neck:  Supple, symmetrical, trachea midline, no adenopathy;    thyroid: no enlargement/tenderness/nodules; no carotid  bruit or JVD    Lungs:   Clear to auscultation bilaterally; no rales, rhonchi or wheezing; respirations unlabored    Heart[de-identified]   S1 and S2 normal; regular rate and rhythm; no murmur, rub, or gallop     Abdomen:   Soft, non-tender, non-distended; normal bowel sounds; no masses, no organomegaly    Genitalia:   Deferred    Rectal:   Deferred    Extremities:  No cyanosis, clubbing or edema    Pulses:  2+ and symmetric all extremities    Skin:  Skin color, texture, turgor normal, no rashes or lesions    Lymph nodes:  No palpable cervical, axillary or inguinal lymphadenopathy        Lab Results:   Admission on 08/13/2018   Component Date Value    Sodium 08/14/2018 142     Potassium 08/14/2018 4 0     Chloride 08/14/2018 107     CO2 08/14/2018 27     Anion Gap 08/14/2018 8     BUN 08/14/2018 13     Creatinine 08/14/2018 1 01     Glucose 08/14/2018 118     Calcium 08/14/2018 8 8     AST 08/14/2018 18     ALT 08/14/2018 19     Alkaline Phosphatase 08/14/2018 70     Total Protein 08/14/2018 7 1     Albumin 08/14/2018 4 1     Total Bilirubin 08/14/2018 0 20     eGFR 08/14/2018 103     WBC 08/14/2018 20 57*    RBC 08/14/2018 4 73     Hemoglobin 08/14/2018 15 0     Hematocrit 08/14/2018 45 9     MCV 08/14/2018 97     MCH 08/14/2018 31 7     MCHC 08/14/2018 32 7     RDW 08/14/2018 12 6     MPV 08/14/2018 10 8     Platelets 47/54/2907 282     nRBC 08/14/2018 0     Neutrophils Relative 08/14/2018 78*    Immat GRANS % 08/14/2018 0     Lymphocytes Relative 08/14/2018 15     Monocytes Relative 08/14/2018 6     Eosinophils Relative 08/14/2018 1     Basophils Relative 08/14/2018 0     Neutrophils Absolute 08/14/2018 16 01*    Immature Grans Absolute 08/14/2018 0 07     Lymphocytes Absolute 08/14/2018 3 10     Monocytes Absolute 08/14/2018 1 19     Eosinophils Absolute 08/14/2018 0 14     Basophils Absolute 08/14/2018 0 06     Lipase 08/14/2018 101     Ethanol Lvl 08/14/2018 <3     Sodium 08/14/2018 141     Potassium 08/14/2018 3 6     Chloride 08/14/2018 106     CO2 08/14/2018 24     Anion Gap 08/14/2018 11     BUN 08/14/2018 11     Creatinine 08/14/2018 0 85     Glucose 08/14/2018 117     Glucose, Fasting 08/14/2018 117*    Calcium 08/14/2018 8 0*    eGFR 08/14/2018 121     WBC 08/14/2018 18 72*    RBC 08/14/2018 4 08     Hemoglobin 08/14/2018 12 9     Hematocrit 08/14/2018 39 6     MCV 08/14/2018 97     MCH 08/14/2018 31 6     MCHC 08/14/2018 32 6     RDW 08/14/2018 12 5     MPV 08/14/2018 11 4     Platelets 76/04/5972 253     nRBC 08/14/2018 0     Neutrophils Relative 08/14/2018 93*    Immat GRANS % 08/14/2018 1     Lymphocytes Relative 08/14/2018 3*    Monocytes Relative 08/14/2018 3*    Eosinophils Relative 08/14/2018 0     Basophils Relative 08/14/2018 0     Neutrophils Absolute 08/14/2018 17 52*    Immature Grans Absolute 08/14/2018 0 10     Lymphocytes Absolute 08/14/2018 0 55*    Monocytes Absolute 08/14/2018 0 51     Eosinophils Absolute 08/14/2018 0 00     Basophils Absolute 08/14/2018 0 04     Amph/Meth UR 08/14/2018 Negative     Barbiturate Ur 08/14/2018 Negative     Benzodiazepine Urine 08/14/2018 Negative     Cocaine Urine 08/14/2018 Negative     Methadone Urine 08/14/2018 Negative     Opiate Urine 08/14/2018 Negative     PCP Ur 08/14/2018 Negative     THC Urine 08/14/2018 Positive*         Imaging Studies: I have personally reviewed pertinent reports  CT ABDOMEN AND PELVIS WITH IV CONTRAST     INDICATION:   Abdominal pain, gastroenteritis or colitis suspected      COMPARISON:  CT of the abdomen and pelvis on January 12, 2012      TECHNIQUE:  CT examination of the abdomen and pelvis was performed  Axial, sagittal, and coronal 2D reformatted images were created from the source data and submitted for interpretation      Radiation dose length product (DLP) for this visit:  468 93 mGy-cm     This examination, like all CT scans performed in the [facility], was performed utilizing techniques to minimize radiation dose exposure, including the use of iterative   reconstruction and automated exposure control      IV Contrast:  100 mL of iohexol (OMNIPAQUE)  was administered intravenously without immediate adverse reaction  Enteric Contrast:  Enteric contrast was not administered      FINDINGS:     ABDOMEN     LOWER CHEST:  No clinically significant abnormality identified in the visualized lower chest      LIVER/BILIARY TREE:  There is periportal edema      GALLBLADDER:  No calcified gallstones  No pericholecystic inflammatory change      SPLEEN:  Unremarkable      PANCREAS:  Unremarkable      ADRENAL GLANDS:  Unremarkable      KIDNEYS/URETERS:  Unremarkable  No hydronephrosis      STOMACH AND BOWEL:  Fluid-filled distal small bowel and mild hyperenhancement of the small bowel wall is nonspecific however may be seen in the setting of enteritis  There is no bowel obstruction  Unremarkable colon      APPENDIX:  Status post appendectomy      ABDOMINOPELVIC CAVITY:  No ascites or free intraperitoneal air  No lymphadenopathy      VESSELS:  Unremarkable for patient's age      PELVIS     REPRODUCTIVE ORGANS:  Unremarkable for patient's age      URINARY BLADDER:  Unremarkable      ABDOMINAL WALL/INGUINAL REGIONS:  Unremarkable      OSSEOUS STRUCTURES:  No acute fracture or destructive osseous lesion      IMPRESSION:     Fluid-filled distal small bowel and mild hyperenhancement of the small bowel wall is nonspecific however may be seen in the setting of enteritis          ASSESSMENT/PLAN:     #1   Acute onset of nausea/vomiting and diarrhea, with multiple sick contacts reported at home with similar symptomatology - appears most likely secondary to a viral gastroenteritis  Clinically appears stable/mildly improving at this time      - Continue IV fluids  - Gradually advance diet as tolerated; will advance to clear liquids now  - Check stool enteric panel, leukocytes, O+P  - Monitor temperature, WBC count, serial abdominal exams  - If no clinical improvement despite bowel rest and ABX, then may consider EGD/colonoscopy for further evaluation; otherwise this does not appear indicated          The patient was seen and examined by Dr Albertina Nichols, all key medical decisions were made with Dr Albertina Nichols  Thank you for allowing us to participate in the care of this pleasant patient  We will follow up with you closely

## 2018-08-14 NOTE — H&P
History and Physical - [facility] Internal Medicine    Patient Information: Aquiles Steward 20 y o  male MRN: [id number]  Unit/Bed#: ED 04 Encounter: [id number]  Admitting Physician: Alexis Lopez MD  PCP: No primary care provider on file  Date of Admission:  08/14/18    Chief Complaint:     Vomiting, abdominal pain     of Present Illness:    Aquiles Steward is a 20 y o  male with no significant PMH who presents with abdominal pain, nausea, vomiting and diarrhea  He states that he was in his usual state of health until yesterday when he began to have episodes loose diarrhea and abdominal pain  He then began to have severe nausea and vomiting and has been unable to keep anything down since  He denies any recent alcohol use and denies any heavy alcohol intake  He came to the ED where CT imaging revealed possible enteritis  Currently he reports diffuse abdominal pain and nausea  He does not report any melena or blood in his stools  No other complaints or concerns       Review of Systems:    Review of Systems   Unable to perform ROS: Acuity of condition       Past Medical and Surgical History:     Past Medical History:   Diagnosis Date    ETOH abuse        Past Surgical History:   Procedure Laterality Date    APPENDECTOMY      KNEE SURGERY      x 4       Meds/Allergies:    PTA meds:   None       Allergies: No Known Allergies  History:     Marital Status: Single   History   Alcohol Use    Yes     Comment: rare     History   Smoking Status    Current Every Day Smoker    Packs/day: 1 00   Smokeless Tobacco    Never Used     History   Drug Use    Types: Marijuana     Comment: smoked last night       Physical Exam:     Vitals:   Blood Pressure: 131/81 (08/14/18 0000)  Pulse: 61 (08/14/18 0000)  Temperature: (!) 96 6 °F (35 9 °C) (08/14/18 0000)  Temp Source: Tympanic (08/14/18 0000)  Respirations: 18 (08/14/18 0000)  Height: 5' 9" (175 3 cm) (08/14/18 0000)  Weight - Scale: 77 1 kg (170 lb) (08/14/18 0000)  SpO2: 98 % (08/14/18 0000)    Physical Exam:   General: uncomfortable due to nausea and pain   HEENT: atraumatic, normocephalic  Skin: no jaundice  CVS: RRR, no murmurs appreciated  Lungs: CTAL, no wheezing or rales appreciated  Abdomen: soft, nondistended, bowel sounds decreased, diffuse tenderness  upon palpation, no guarding or rebound tenderness  Extremities: no edema, no calf swelling or tenderness  Neuro: alert and oriented x3  Psych: anxious       Lab Results: I have personally reviewed pertinent reports  Results from last 7 days  Lab Units 08/14/18  0017   WBC Thousand/uL 20 57*   HEMOGLOBIN g/dL 15 0   HEMATOCRIT % 45 9   PLATELETS Thousands/uL 282   NEUTROS PCT % 78*   LYMPHS PCT % 15   MONOS PCT % 6   EOS PCT % 1       Results from last 7 days  Lab Units 08/14/18  0017   SODIUM mmol/L 142   POTASSIUM mmol/L 4 0   CHLORIDE mmol/L 107   CO2 mmol/L 27   BUN mg/dL 13   CREATININE mg/dL 1 01   CALCIUM mg/dL 8 8   TOTAL PROTEIN g/dL 7 1   BILIRUBIN TOTAL mg/dL 0 20   ALK PHOS U/L 70   ALT U/L 19   AST U/L 18   GLUCOSE RANDOM mg/dL 118           Imaging:     Ct Abdomen Pelvis With Contrast    Result Date: 8/14/2018  Narrative: CT ABDOMEN AND PELVIS WITH IV CONTRAST INDICATION:   Abdominal pain, gastroenteritis or colitis suspected  COMPARISON:  CT of the abdomen and pelvis on January 12, 2012  TECHNIQUE:  CT examination of the abdomen and pelvis was performed  Axial, sagittal, and coronal 2D reformatted images were created from the source data and submitted for interpretation  Radiation dose length product (DLP) for this visit:  468 93 mGy-cm   This examination, like all CT scans performed in the [facility], was performed utilizing techniques to minimize radiation dose exposure, including the use of iterative  reconstruction and automated exposure control  IV Contrast:  100 mL of iohexol (OMNIPAQUE)  was administered intravenously without immediate adverse reaction   Enteric Contrast:  Enteric contrast was not administered  FINDINGS: ABDOMEN LOWER CHEST:  No clinically significant abnormality identified in the visualized lower chest  LIVER/BILIARY TREE:  There is periportal edema  GALLBLADDER:  No calcified gallstones  No pericholecystic inflammatory change  SPLEEN:  Unremarkable  PANCREAS:  Unremarkable  ADRENAL GLANDS:  Unremarkable  KIDNEYS/URETERS:  Unremarkable  No hydronephrosis  STOMACH AND BOWEL:  Fluid-filled distal small bowel and mild hyperenhancement of the small bowel wall is nonspecific however may be seen in the setting of enteritis  There is no bowel obstruction  Unremarkable colon  APPENDIX:  Status post appendectomy  ABDOMINOPELVIC CAVITY:  No ascites or free intraperitoneal air  No lymphadenopathy  VESSELS:  Unremarkable for patient's age  PELVIS REPRODUCTIVE ORGANS:  Unremarkable for patient's age  URINARY BLADDER:  Unremarkable  ABDOMINAL WALL/INGUINAL REGIONS:  Unremarkable  OSSEOUS STRUCTURES:  No acute fracture or destructive osseous lesion  Impression: Fluid-filled distal small bowel and mild hyperenhancement of the small bowel wall is nonspecific however may be seen in the setting of enteritis  Workstation performed: [id number]         Assessment/Plan    * Intractable nausea/vomiting and abdominal pain likely secondary to Viral Gastroenteritis    Assessment & Plan    CT reveals possible enteritis  Alcohol level is negative  LFTs and lipase are unremarkable  Will admit to Medicine, keep NPO, start on IV NS, order Zofran prn, order stool studies and have GI evaluate the pt         SIRS (systemic inflammatory response syndrome) ([address] )   Assessment & Plan    Likely due to above    Will hydrate with IV NS, manage as above and repeat CBC later today             Hospital Problem List:     Principal Problem:    Intractable nausea/vomiting and abdominal pain likely secondary to Viral Gastroenteritis   Active Problems:    S[facility] (systemic inflammatory response syndrome) [facility]        VTE Prophylaxis: Heparin   Code Status: No Order    Anticipated Length of Stay:  Patient will be admitted on an Observation basis with an anticipated length of stay of atleast 1 midnights  Total Time for Visit, including Counseling / Coordination of Care: 30 minutes  Greater than 50% of this total time spent on direct patient counseling and coordination of care

## 2018-08-14 NOTE — ED PROVIDER NOTES
History  Chief Complaint   Patient presents with    Vomiting     Patient states he started two hours ago with vomiting and diarrhea,no fever     23 yowm c/o severe vomiting and diarrhea for last few hours  + associated severe abdominal pains all over  No fever  No rash or swelling  Denies alcohol use  No h/o abdominal surgery  History provided by:  Patient   used: No    Vomiting   Associated symptoms: abdominal pain and diarrhea    Associated symptoms: no chills, no cough, no fever, no headaches, no myalgias and no sore throat        None       Past Medical History:   Diagnosis Date    ETOH abuse        Past Surgical History:   Procedure Laterality Date    APPENDECTOMY      KNEE SURGERY      x 4       Family History   Problem Relation Age of Onset    No Known Problems Mother      I have reviewed and agree with the history as documented  Social History   Substance Use Topics    Smoking status: Current Every Day Smoker     Packs/day: 1 00    Smokeless tobacco: Never Used    Alcohol use Yes      Comment: rare        Review of Systems   Constitutional: Negative  Negative for chills and fever  HENT: Negative  Negative for congestion and sore throat  Eyes: Negative  Respiratory: Negative  Negative for cough and shortness of breath  Cardiovascular: Negative  Negative for chest pain and leg swelling  Gastrointestinal: Positive for abdominal pain, diarrhea, nausea and vomiting  Genitourinary: Negative  Negative for dysuria, flank pain and hematuria  Musculoskeletal: Negative  Negative for back pain and myalgias  Skin: Negative  Negative for rash and wound  Neurological: Negative  Negative for dizziness and headaches  Psychiatric/Behavioral: Negative  Negative for confusion and hallucinations  The patient is not nervous/anxious  All other systems reviewed and are negative        Physical Exam  Physical Exam   Constitutional: He appears well-developed and well-nourished  No distress  Actively vomiting liquid   HENT:   Head: Normocephalic and atraumatic  Eyes: Conjunctivae are normal  Pupils are equal, round, and reactive to light  No scleral icterus  Neck: Normal range of motion  Neck supple  Cardiovascular: Normal rate, regular rhythm and normal heart sounds  No murmur heard  Pulmonary/Chest: Effort normal and breath sounds normal  No respiratory distress  He exhibits no tenderness  Abdominal: Soft  Bowel sounds are normal  He exhibits no distension  There is tenderness  + ttp diffusely across upper abdomen   Musculoskeletal: Normal range of motion  He exhibits no edema, tenderness or deformity  Neurological: He is alert  No cranial nerve deficit  He exhibits normal muscle tone  Skin: Skin is warm  No rash noted  He is diaphoretic  No erythema  No pallor  Psychiatric: He has a normal mood and affect  His behavior is normal    Nursing note and vitals reviewed  Vital Signs  ED Triage Vitals   Temperature Pulse Respirations Blood Pressure SpO2   08/14/18 0000 08/14/18 0000 08/14/18 0000 08/14/18 0000 08/14/18 0000   (!) 96 6 °F (35 9 °C) 61 18 131/81 98 %      Temp Source Heart Rate Source Patient Position - Orthostatic VS BP Location FiO2 (%)   08/14/18 0000 08/14/18 0000 08/14/18 0000 08/14/18 0000 --   Tympanic Monitor Lying Left arm       Pain Score       08/14/18 0017       7           Vitals:    08/14/18 0000   BP: 131/81   Pulse: 61   Patient Position - Orthostatic VS: Lying       Visual Acuity      ED Medications  Medications   sodium chloride 0 9 % bolus 1,000 mL (1,000 mL Intravenous New Bag 8/14/18 0016)   ondansetron (ZOFRAN) injection 4 mg (4 mg Intravenous Given 8/14/18 0017)   ketorolac (TORADOL) injection 30 mg (30 mg Intravenous Given 8/14/18 0017)       Diagnostic Studies  Results Reviewed     Procedure Component Value Units Date/Time    Ethanol [[id number]] Collected:  08/14/18 0017    Lab Status:   In process Specimen:  Blood from Arm, Right Updated:  08/14/18 0019    Comprehensive metabolic panel [[id number]] Collected:  08/14/18 0017    Lab Status: In process Specimen:  Blood from Arm, Right Updated:  08/14/18 0019    Lipase [[id number]] Collected:  08/14/18 0017    Lab Status: In process Specimen:  Blood from Arm, Right Updated:  08/14/18 0019    CBC and differential [[id number]] Collected:  08/14/18 0017    Lab Status: In process Specimen:  Blood from Arm, Right Updated:  08/14/18 0019                 CT abdomen pelvis with contrast    (Results Pending)              Procedures  Procedures       Phone Contacts  ED Phone Contact    ED Course                               MDM  Number of Diagnoses or Management Options  Abdominal pain:   Gastroenteritis:   Intractable abdominal pain:   Intractable vomiting:   Vomiting and diarrhea:   Diagnosis management comments: Will check labs/lipase, CT scan to r/o pancreatitis or colitis, IVF hydration and treat symptoms  Signed out to night Dr  Due to end of shift with studies pending  CritCare Time    Disposition  Final diagnoses:   Abdominal pain   Vomiting and diarrhea     Time reflects when diagnosis was documented in both MDM as applicable and the Disposition within this note     Time User Action Codes Description Comment    7/89/4444 43:30 AM [place] A Add [B15 1] Abdominal pain     8/79/6211 75:33 AM Calista Factor Add [O86 64,  R19 7] Vomiting and diarrhea       ED Disposition     None      Follow-up Information    None         Patient's Medications   Discharge Prescriptions    No medications on file     No discharge procedures on file      ED Provider  Electronically Signed by           Charisse Starks MD  71/27/12 5215

## 2018-08-14 NOTE — SOCIAL WORK
Met with pt and his mother Annie  Pt resides alone, drives, and is employed  Explained role of cm, no d/c needs anticipated  CM reviewed d/c planning process including the following: identifying help at home, patient preference for d/c planning needs, and availability of the treatment team to discuss questions or concerns patient and/or family may have regarding understanding of medications and recognizing signs and symptoms once discharged  CM also encouraged patient to follow up with all recommended appointments after discharge  Patient advised of importance for patient and family to participate in managing patient's medical well being

## 2018-08-14 NOTE — ASSESSMENT & PLAN NOTE
Likely due to above  IV hydration was done  WBC remains elevated  Pt is afebrile, denies diarrhea, N/V  IV d/c, pt tolerated clear diets well  Left AMA despite discussing benefits of staying  Pt verbalizes understanding   Pt denies abd  pain on exam, darrhea, N/V

## 2018-08-14 NOTE — ASSESSMENT & PLAN NOTE
CT reveals possible enteritis  Alcohol level is negative  LFTs and lipase are unremarkable  Admits to having family exposed to the same symptoms  Pt was admitted and  kept NPO, hydrated  Ordered stool studies but pt did not have any BM  GI consulted and evaluated pt  Appreciate input and recommendations  During his stay, he had no diarrhea/nausea and vomiting  On abdominal exam, he denies abdominal tenderness/rebound  He has hypoactive BS  He tolerated clear liquid diet with no vomiting and diarrhea here  Pt left AMA even after explaining risks and benefits  Pt verbalize understanding

## 2018-08-15 LAB — MRSA NOSE QL CULT: NORMAL

## 2019-01-03 ENCOUNTER — APPOINTMENT (EMERGENCY)
Dept: RADIOLOGY | Facility: HOSPITAL | Age: 24
End: 2019-01-03
Payer: COMMERCIAL

## 2019-01-03 ENCOUNTER — HOSPITAL ENCOUNTER (EMERGENCY)
Facility: HOSPITAL | Age: 24
Discharge: HOME | End: 2019-01-03
Attending: EMERGENCY MEDICINE | Admitting: EMERGENCY MEDICINE
Payer: COMMERCIAL

## 2019-01-03 VITALS
HEIGHT: 71 IN | BODY MASS INDEX: 21.42 KG/M2 | TEMPERATURE: 98.4 F | HEART RATE: 70 BPM | WEIGHT: 153 LBS | DIASTOLIC BLOOD PRESSURE: 82 MMHG | OXYGEN SATURATION: 98 % | RESPIRATION RATE: 18 BRPM | SYSTOLIC BLOOD PRESSURE: 118 MMHG

## 2019-01-03 DIAGNOSIS — R07.89 CHEST WALL PAIN: Primary | ICD-10-CM

## 2019-01-03 LAB
ALBUMIN SERPL BCP-MCNC: 4.1 G/DL (ref 3.5–5)
ALP SERPL-CCNC: 77 U/L (ref 46–116)
ALT SERPL W P-5'-P-CCNC: 22 U/L (ref 12–78)
ANION GAP SERPL CALCULATED.3IONS-SCNC: 11 MMOL/L (ref 4–13)
APTT PPP: 27 SECONDS (ref 24–33)
AST SERPL W P-5'-P-CCNC: 20 U/L (ref 5–45)
BASOPHILS # BLD AUTO: 0.04 THOUSANDS/ΜL (ref 0–0.1)
BASOPHILS NFR BLD AUTO: 1 % (ref 0–1)
BILIRUB SERPL-MCNC: 0.4 MG/DL (ref 0.2–1)
BUN SERPL-MCNC: 15 MG/DL (ref 5–25)
CALCIUM SERPL-MCNC: 8.8 MG/DL (ref 8.3–10.1)
CHLORIDE SERPL-SCNC: 103 MMOL/L (ref 100–108)
CO2 SERPL-SCNC: 26 MMOL/L (ref 21–32)
CREAT SERPL-MCNC: 0.97 MG/DL (ref 0.6–1.3)
EOSINOPHIL # BLD AUTO: 0.06 THOUSAND/ΜL (ref 0–0.61)
EOSINOPHIL NFR BLD AUTO: 1 % (ref 0–6)
ERYTHROCYTE [DISTWIDTH] IN BLOOD BY AUTOMATED COUNT: 12.5 % (ref 11.6–15.1)
GFR SERPL CREATININE-BSD FRML MDRD: 108 ML/MIN/1.73SQ M
GLUCOSE SERPL-MCNC: 85 MG/DL (ref 65–140)
HCT VFR BLD AUTO: 44.7 % (ref 36.5–49.3)
HGB BLD-MCNC: 14.8 G/DL (ref 12–17)
IMM GRANULOCYTES # BLD AUTO: 0.01 THOUSAND/UL (ref 0–0.2)
IMM GRANULOCYTES NFR BLD AUTO: 0 % (ref 0–2)
INR PPP: 1.02 (ref 0.86–1.16)
LYMPHOCYTES # BLD AUTO: 1.09 THOUSANDS/ΜL (ref 0.6–4.47)
LYMPHOCYTES NFR BLD AUTO: 15 % (ref 14–44)
MAGNESIUM SERPL-MCNC: 2.1 MG/DL (ref 1.6–2.6)
MCH RBC QN AUTO: 31.9 PG (ref 26.8–34.3)
MCHC RBC AUTO-ENTMCNC: 33.1 G/DL (ref 31.4–37.4)
MCV RBC AUTO: 96 FL (ref 82–98)
MONOCYTES # BLD AUTO: 0.95 THOUSAND/ΜL (ref 0.17–1.22)
MONOCYTES NFR BLD AUTO: 13 % (ref 4–12)
NEUTROPHILS # BLD AUTO: 5.01 THOUSANDS/ΜL (ref 1.85–7.62)
NEUTS SEG NFR BLD AUTO: 70 % (ref 43–75)
NRBC BLD AUTO-RTO: 0 /100 WBCS
PLATELET # BLD AUTO: 277 THOUSANDS/UL (ref 149–390)
PMV BLD AUTO: 10.7 FL (ref 8.9–12.7)
POTASSIUM SERPL-SCNC: 4.1 MMOL/L (ref 3.5–5.3)
PROT SERPL-MCNC: 7 G/DL (ref 6.4–8.2)
PROTHROMBIN TIME: 10.7 SECONDS (ref 9.4–11.7)
RBC # BLD AUTO: 4.64 MILLION/UL (ref 3.88–5.62)
SODIUM SERPL-SCNC: 140 MMOL/L (ref 136–145)
TROPONIN I SERPL-MCNC: <0.02 NG/ML
WBC # BLD AUTO: 7.16 THOUSAND/UL (ref 4.31–10.16)

## 2019-01-03 PROCEDURE — 85610 PROTHROMBIN TIME: CPT | Performed by: EMERGENCY MEDICINE

## 2019-01-03 PROCEDURE — 85730 THROMBOPLASTIN TIME PARTIAL: CPT | Performed by: EMERGENCY MEDICINE

## 2019-01-03 PROCEDURE — 71275 CT ANGIOGRAPHY CHEST: CPT

## 2019-01-03 PROCEDURE — 99285 EMERGENCY DEPT VISIT HI MDM: CPT

## 2019-01-03 PROCEDURE — 80053 COMPREHEN METABOLIC PANEL: CPT | Performed by: EMERGENCY MEDICINE

## 2019-01-03 PROCEDURE — 84484 ASSAY OF TROPONIN QUANT: CPT | Performed by: EMERGENCY MEDICINE

## 2019-01-03 PROCEDURE — 96360 HYDRATION IV INFUSION INIT: CPT

## 2019-01-03 PROCEDURE — 85025 COMPLETE CBC W/AUTO DIFF WBC: CPT | Performed by: EMERGENCY MEDICINE

## 2019-01-03 PROCEDURE — 36415 COLL VENOUS BLD VENIPUNCTURE: CPT | Performed by: EMERGENCY MEDICINE

## 2019-01-03 PROCEDURE — 93005 ELECTROCARDIOGRAM TRACING: CPT

## 2019-01-03 PROCEDURE — 83735 ASSAY OF MAGNESIUM: CPT | Performed by: EMERGENCY MEDICINE

## 2019-01-03 RX ADMIN — SODIUM CHLORIDE 1000 ML: 0.9 INJECTION, SOLUTION INTRAVENOUS at 12:59

## 2019-01-03 RX ADMIN — IOHEXOL 85 ML: 350 INJECTION, SOLUTION INTRAVENOUS at 13:39

## 2019-01-03 NOTE — DISCHARGE INSTRUCTIONS
Chest Wall Pain   WHAT YOU NEED TO KNOW:   Chest wall pain may be caused by problems with the muscles, cartilage, or bones of the chest wall  Chest wall pain may also be caused by pain that spreads to your chest from another part of your body  The pain may be aching, severe, dull, or sharp  It may come and go, or it may be constant  The pain may be worse when you move in certain ways, breathe deeply, or cough  DISCHARGE INSTRUCTIONS:   Call 911 if:   · You have any of the following signs of a heart attack:      ¨ Squeezing, pressure, or pain in your chest that lasts longer than 5 minutes or returns    ¨ Discomfort or pain in your back, neck, jaw, stomach, or arm     ¨ Trouble breathing    ¨ Nausea or vomiting    ¨ Lightheadedness or a sudden cold sweat, especially with chest pain or trouble breathing    Return to the emergency department if:   · You have severe pain  Contact your healthcare provider if:   · You develop a rash  · You have other new symptoms  · Your pain does not improve, even with treatment  · You have questions or concerns about your condition or care  Medicines: You may need any of the following:  · NSAIDs , such as ibuprofen, help decrease swelling, pain, and fever  This medicine is available with or without a doctor's order  NSAIDs can cause stomach bleeding or kidney problems in certain people  If you take blood thinner medicine, always ask your healthcare provider if NSAIDs are safe for you  Always read the medicine label and follow directions  · Acetaminophen  decreases pain  It is available without a doctor's order  Ask how much to take and how often to take it  Follow directions  Acetaminophen can cause liver damage if not taken correctly  · A cream  may be applied to your chest to decrease pain  · Take your medicine as directed  Contact your healthcare provider if you think your medicine is not helping or if you have side effects   Tell him of her if you are allergic to any medicine  Keep a list of the medicines, vitamins, and herbs you take  Include the amounts, and when and why you take them  Bring the list or the pill bottles to follow-up visits  Carry your medicine list with you in case of an emergency  Follow up with your healthcare provider as directed:  Write down your questions so you remember to ask them during your visits  Self-care:   · Rest  as needed  Avoid activities that make your chest wall pain worse  · Apply heat  on your chest for 20 to 30 minutes every 2 hours for as many days as directed  Heat helps decrease pain and muscle spasms  · Apply ice  on your chest for 15 to 20 minutes every hour or as directed  Use an ice pack, or put crushed ice in a plastic bag  Cover it with a towel  Ice helps prevent tissue damage and decreases swelling and pain  © 2017 [employer]  Information is for End User's use only and may not be sold, redistributed or otherwise used for commercial purposes  All illustrations and images included in CareNotes® are the copyrighted property of [employer]  or Clarence Castro  The above information is an  only  It is not intended as medical advice for individual conditions or treatments  Talk to your doctor, nurse or pharmacist before following any medical regimen to see if it is safe and effective for you

## 2019-01-03 NOTE — ED PROVIDER NOTES
History  Chief Complaint   Patient presents with    Chest Pain     L sided CP x 4-5 wks, worse with deep inspiration, bending over to put on shoes and other actions  Describes pain as "inconsistent"  Patient is a 49-year-old male that presents from an outpatient clinic for complaint of for 5 weeks of intermittent left-sided chest pain that is anterior, it is sharp stabbing usually it is nonradiating and has been lasting only seconds  Patient does seem to have any aggravating or alleviating factors associated with this pain  Patient also has no associated symptoms such as diaphoresis or shortness of breath when he has these episodes  Patient's EKG at the outpatient clinic which was normal but they sent him in to rule out a PE  Patient has also states that according to the clinic that he goes to he has had a proximally he 20 lb weight loss in 3 months  Patient states he is not trying to the lose weight and has not noticed any change in his diet  Patient denies any polydipsia or polyuria  Patient has no family history of coronary artery disease, he does not use any illegal substances such as cocaine  None       Past Medical History:   Diagnosis Date    ETOH abuse        Past Surgical History:   Procedure Laterality Date    APPENDECTOMY      KNEE SURGERY      x 4       Family History   Problem Relation Age of Onset    No Known Problems Mother     Hypertension Maternal Grandmother     Hypertension Maternal Grandfather     Hypertension Paternal Grandmother     Hypertension Paternal Grandfather      I have reviewed and agree with the history as documented  Social History   Substance Use Topics    Smoking status: Current Every Day Smoker     Packs/day: 1 00    Smokeless tobacco: Never Used    Alcohol use Yes      Comment: "monthly" per patient        Review of Systems   Constitutional: Positive for unexpected weight change  Negative for chills and fever     HENT: Negative for facial swelling and trouble swallowing  Eyes: Negative for photophobia and visual disturbance  Respiratory: Negative for chest tightness and shortness of breath  Cardiovascular: Positive for chest pain  Negative for palpitations and leg swelling  Gastrointestinal: Negative for abdominal pain, nausea and vomiting  Endocrine: Negative for polydipsia and polyuria  Genitourinary: Negative for difficulty urinating, dysuria and flank pain  Musculoskeletal: Negative for back pain and neck pain  Skin: Negative  Neurological: Negative for weakness and numbness  Hematological: Negative  Psychiatric/Behavioral: Negative  Physical Exam  Physical Exam   Constitutional: He is oriented to person, place, and time  He appears well-developed and well-nourished  No distress  HENT:   Head: Normocephalic and atraumatic  Eyes: Pupils are equal, round, and reactive to light  EOM are normal    Neck: Normal range of motion  Cardiovascular: Normal rate and regular rhythm  Pulmonary/Chest: Effort normal and breath sounds normal  No respiratory distress  Abdominal: Soft  He exhibits no distension  There is no tenderness  Musculoskeletal: Normal range of motion  He exhibits no edema  Neurological: He is alert and oriented to person, place, and time  Skin: Skin is warm and dry  Psychiatric: He has a normal mood and affect  Nursing note and vitals reviewed        Vital Signs  ED Triage Vitals [01/03/19 1216]   Temperature Pulse Respirations Blood Pressure SpO2   98 4 °F (36 9 °C) 69 18 118/82 100 %      Temp Source Heart Rate Source Patient Position - Orthostatic VS BP Location FiO2 (%)   Oral Monitor Sitting Left arm --      Pain Score       No Pain           Vitals:    01/03/19 1216 01/03/19 1315   BP: 118/82    Pulse: 69 70   Patient Position - Orthostatic VS: Sitting        Visual Acuity      ED Medications  Medications   sodium chloride 0 9 % bolus 1,000 mL (0 mL Intravenous Stopped 1/3/19 1420) iohexol (OMNIPAQUE) 350 MG/ML injection (MULTI-DOSE) 85 mL (85 mL Intravenous Given 1/3/19 1339)       Diagnostic Studies  Results Reviewed     Procedure Component Value Units Date/Time    Protime-INR [[id number]]  (Normal) Collected:  01/03/19 1301    Lab Status:  Final result Specimen:  Blood from Arm, Right Updated:  01/03/19 1334     Protime 10 7 seconds      INR 1 02    APTT [[id number]]  (Normal) Collected:  01/03/19 1301    Lab Status:  Final result Specimen:  Blood from Arm, Right Updated:  01/03/19 1334     PTT 27 seconds     Comprehensive metabolic panel [[id number]] Collected:  01/03/19 1301    Lab Status:  Final result Specimen:  Blood from Arm, Right Updated:  01/03/19 1334     Sodium 140 mmol/L      Potassium 4 1 mmol/L      Chloride 103 mmol/L      CO2 26 mmol/L      ANION GAP 11 mmol/L      BUN 15 mg/dL      Creatinine 0 97 mg/dL      Glucose 85 mg/dL      Calcium 8 8 mg/dL      AST 20 U/L      ALT 22 U/L      Alkaline Phosphatase 77 U/L      Total Protein 7 0 g/dL      Albumin 4 1 g/dL      Total Bilirubin 0 40 mg/dL      eGFR 108 ml/min/1 73sq m     Narrative:         National Kidney Disease Education Program recommendations are as follows:  GFR calculation is accurate only with a steady state creatinine  Chronic Kidney disease less than 60 ml/min/1 73 sq  meters  Kidney failure less than 15 ml/min/1 73 sq  meters      Magnesium [[id number]]  (Normal) Collected:  01/03/19 1301    Lab Status:  Final result Specimen:  Blood from Arm, Right Updated:  01/03/19 1334     Magnesium 2 1 mg/dL     Troponin I [[id number]]  (Normal) Collected:  01/03/19 1301    Lab Status:  Final result Specimen:  Blood from Arm, Right Updated:  01/03/19 1332     Troponin I <0 02 ng/mL     CBC and differential [[id number]]  (Abnormal) Collected:  01/03/19 1301    Lab Status:  Final result Specimen:  Blood from Arm, Right Updated:  01/03/19 1312     WBC 7 16 Thousand/uL      RBC 4 64 Million/uL      Hemoglobin 14 8 g/dL Hematocrit 44 7 %      MCV 96 fL      MCH 31 9 pg      MCHC 33 1 g/dL      RDW 12 5 %      MPV 10 7 fL      Platelets 881 Thousands/uL      nRBC 0 /100 WBCs      Neutrophils Relative 70 %      Immat GRANS % 0 %      Lymphocytes Relative 15 %      Monocytes Relative 13 (H) %      Eosinophils Relative 1 %      Basophils Relative 1 %      Neutrophils Absolute 5 01 Thousands/µL      Immature Grans Absolute 0 01 Thousand/uL      Lymphocytes Absolute 1 09 Thousands/µL      Monocytes Absolute 0 95 Thousand/µL      Eosinophils Absolute 0 06 Thousand/µL      Basophils Absolute 0 04 Thousands/µL                  CTA ED chest PE study   Final Result by Gautam Agrawal MD (01/03 1839)      Normal examination  No pulmonary embolus  Clear lungs  Workstation performed: [id number]                    Procedures  ECG 12 Lead Documentation  Date/Time: 1/3/2019 12:30 PM  Performed by: Chantelle Parker by: Matias Garcia     Indications / Diagnosis:  Chest pain  ECG reviewed by me, the ED Provider: yes    Patient location:  ED  Previous ECG:     Previous ECG:  Unavailable    Comparison to cardiac monitor: Yes    Interpretation:     Interpretation: normal    Rate:     ECG rate:  63    ECG rate assessment: normal    Rhythm:     Rhythm: sinus rhythm    Ectopy:     Ectopy: none    QRS:     QRS axis:  Normal  Conduction:     Conduction: normal    ST segments:     ST segments:  Normal  T waves:     T waves: normal             Phone Contacts  ED Phone Contact    ED Course                               MDM  Number of Diagnoses or Management Options  Chest wall pain:   Diagnosis management comments: Patient was informed that he had a a normal PE study, patient had a normal EKG and normal troponins  This is most likely chest wall pain  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  Patient is requesting time off for the weekend    I answered all questions the best my ability the patient is in agreement the assessment plan        Amount and/or Complexity of Data Reviewed  Clinical lab tests: ordered and reviewed  Tests in the radiology section of CPT®: ordered and reviewed      CritCare Time    Disposition  Final diagnoses:   Chest wall pain     Time reflects when diagnosis was documented in both MDM as applicable and the Disposition within this note     Time User Action Codes Description Comment    1/3/2019  2:16 PM Ora Yeh Add [R07 89] Chest wall pain       ED Disposition     ED Disposition Condition Comment    Discharge  [facility] discharge to home/self care  Condition at discharge: Stable        Follow-up Information     Follow up With Specialties Details Why Contact Info Additional Information    [facility] Emergency Department Emergency Medicine  If symptoms worsen [address] [phone] [facility] ED, [place], [place], [address]          There are no discharge medications for this patient  No discharge procedures on file      ED Provider  Electronically Signed by           Tiffanie Hayden MD  01/03/19 3488       Tiffanie Hayden MD  01/08/19 4046

## 2019-01-04 LAB
ATRIAL RATE: 63 BPM
ATRIAL RATE: 65 BPM
P AXIS: 5 DEGREES
P AXIS: 62 DEGREES
PR INTERVAL: 120 MS
PR INTERVAL: 124 MS
QRS AXIS: 54 DEGREES
QRS AXIS: 62 DEGREES
QRSD INTERVAL: 74 MS
QRSD INTERVAL: 82 MS
QT INTERVAL: 388 MS
QT INTERVAL: 388 MS
QTC INTERVAL: 397 MS
QTC INTERVAL: 403 MS
T WAVE AXIS: 30 DEGREES
T WAVE AXIS: 33 DEGREES
VENTRICULAR RATE: 63 BPM
VENTRICULAR RATE: 65 BPM

## 2019-01-04 PROCEDURE — 93010 ELECTROCARDIOGRAM REPORT: CPT | Performed by: INTERNAL MEDICINE

## 2019-02-12 ENCOUNTER — OFFICE VISIT (OUTPATIENT)
Dept: FAMILY MEDICINE CLINIC | Facility: CLINIC | Age: 24
End: 2019-02-12
Payer: COMMERCIAL

## 2019-02-12 VITALS
WEIGHT: 157.2 LBS | TEMPERATURE: 97.6 F | BODY MASS INDEX: 21.92 KG/M2 | DIASTOLIC BLOOD PRESSURE: 80 MMHG | HEART RATE: 63 BPM | OXYGEN SATURATION: 98 % | RESPIRATION RATE: 18 BRPM | SYSTOLIC BLOOD PRESSURE: 118 MMHG

## 2019-02-12 DIAGNOSIS — A60.01 HERPES SIMPLEX INFECTION OF PENIS: Primary | ICD-10-CM

## 2019-02-12 PROCEDURE — 99213 OFFICE O/P EST LOW 20 MIN: CPT | Performed by: FAMILY MEDICINE

## 2019-02-12 RX ORDER — VALACYCLOVIR HYDROCHLORIDE 1 G/1
1000 TABLET, FILM COATED ORAL DAILY
Qty: 5 TABLET | Refills: 2 | Status: SHIPPED | OUTPATIENT
Start: 2019-02-12 | End: 2019-02-20 | Stop reason: ALTCHOICE

## 2019-02-12 NOTE — PROGRESS NOTES
Mariah Tellez is a 20 y o  male who presents today for evaluation of a painful and vesicular rash for the past 7 days  The rash was first noted on the left side of the penis  The rash has not spread  The rash has been accompanied by burning and pain  Prior to the appearance of the rash, the patient experienced no prodromal symptoms  He has not attempted any form of treatment at home  Patient has a known history of genital herpes infection, diagnosed at age 14  Since then he has had approximately 5-6 outbreaks  He states this feels like a normal outbreak  Since he has waited about a week prior to this appointment, he only has one vesicle visible at this time, and he feels that the infection is resolving, but still wants to be checked out  He otherwise feels well and denies CP, SOB, HA, N/V/D, palpitations, lightheadedness, dizziness, fevers, chills, abdominal pain, or /GI symptoms  Review of Systems  Pertinent items are noted in HPI  Objective     /80 (BP Location: Left arm, Patient Position: Sitting, Cuff Size: Large)   Pulse 63   Temp 97 6 °F (36 4 °C) (Tympanic)   Resp 18   Wt 71 3 kg (157 lb 3 2 oz)   SpO2 98%   BMI 21 92 kg/m²       · There is a rash present on the left side of penis  The rash consists of crusting and a single "popped" erythematous vesicle in a localized distribution with localized erythema  · No generalized erythema, bleeding, or other discharge  · No penile discharge  · No inguinal adenopathy  Assessment/Plan     Herpes simplex infection of penis    · Appears to be resolving, however patient would still benefit from oral antivirals  · Oral valtrex 1 g qd x 5 days  · Based on patient's known history of herpes and that he is educated and aware of symptoms of impending outbreaks, feel comfortable providing a small number of refills so that the patient does not need to wait for appointments for future outbreaks     · Counseled patient that if the frequency or severity of outbreaks increases, may need to come back for reassessment  · Discussed that if he experiences symptoms not consistent with an outbreak, he should come in for evaluation  All patient questions & concerns were addressed  The patient agrees with his treatment plan  RTO sudhakar Cope DO  02/13/19  1:39 PM    Some portions of this record may have been generated with voice recognition software  There may be translation, syntax, or grammatical errors  Occasional wrong word or "sound-a-like" substitutions may have occurred due to the inherent limitations of the voice recognition software  Read the chart carefully and recognize, using context, where substations may have occurred   If you have any questions, please contact the dictating provider for clarification or correction, as needed

## 2019-02-20 ENCOUNTER — OFFICE VISIT (OUTPATIENT)
Dept: CARDIOLOGY CLINIC | Facility: CLINIC | Age: 24
End: 2019-02-20
Payer: COMMERCIAL

## 2019-02-20 VITALS
WEIGHT: 154 LBS | SYSTOLIC BLOOD PRESSURE: 108 MMHG | BODY MASS INDEX: 21.56 KG/M2 | HEIGHT: 71 IN | DIASTOLIC BLOOD PRESSURE: 60 MMHG | HEART RATE: 62 BPM

## 2019-02-20 DIAGNOSIS — R07.9 CHEST PAIN, UNSPECIFIED TYPE: Primary | ICD-10-CM

## 2019-02-20 PROCEDURE — 99213 OFFICE O/P EST LOW 20 MIN: CPT | Performed by: INTERNAL MEDICINE

## 2019-02-20 NOTE — PROGRESS NOTES
Cardiology Consultation     Staci Deras  [id number]  1995  [facility]  [facility] [address] [phone]    Consult for: chest pain  Appreciate consult by: Shayla Emanuel DO      HPI: Staci Deras is a 20y o  year old male who is here for evaluation of chest pain which has been present for the past 5 months  Pain is sharp, lasts for a few seconds then resolves spontaneously  There is no inciting event but feels it worsens with deep inspiration  Feels it every few hours  There was no trauma that initiated the pain  Was seen in ER in January - workup was normal including CT scan  Works doing tree work  No increased stress recently  Past Medical History:   Diagnosis Date    ETOH abuse     Genital herpes 2011     Social History     Socioeconomic History    Marital status: Single     Spouse name: Not on file    Number of children: Not on file    Years of education: Not on file    Highest education level: Not on file   Occupational History    Not on file   Social Needs    Financial resource strain: Not on file    Food insecurity:     Worry: Not on file     Inability: Not on file    Transportation needs:     Medical: Not on file     Non-medical: Not on file   Tobacco Use    Smoking status: Current Every Day Smoker     Packs/day: 1 00    Smokeless tobacco: Never Used   Substance and Sexual Activity    Alcohol use:  Yes     Alcohol/week: 2 4 oz     Types: 4 Shots of liquor per week     Drinks per session: 3 or 4     Binge frequency: Weekly    Drug use: Yes     Frequency: 21 0 times per week     Types: Marijuana     Comment: smoked last night    Sexual activity: Not on file   Lifestyle    Physical activity:     Days per week: Not on file     Minutes per session: Not on file    Stress: Not on file   Relationships    Social connections:     Talks on phone: Not on file     Gets together: Not on file Attends Gnosticism service: Not on file     Active member of club or organization: Not on file     Attends meetings of clubs or organizations: Not on file     Relationship status: Not on file    Intimate partner violence:     Fear of current or ex partner: Not on file     Emotionally abused: Not on file     Physically abused: Not on file     Forced sexual activity: Not on file   Other Topics Concern    Not on file   Social History Narrative    Not on file      Family History   Problem Relation Age of Onset    No Known Problems Mother     Hypertension Maternal Grandmother     Hypertension Maternal Grandfather     Heart disease Maternal Grandfather     Heart attack Maternal Grandfather     Hypertension Paternal Grandmother     Hypertension Paternal Grandfather      Past Surgical History:   Procedure Laterality Date    APPENDECTOMY      KNEE SURGERY      x 4     No current outpatient medications on file  No Known Allergies      Review of Systems:  Review of Systems   Constitutional: Negative for chills, fatigue and fever  HENT: Negative for congestion, nosebleeds and postnasal drip  Respiratory: Negative for cough, chest tightness and shortness of breath  Cardiovascular: Positive for chest pain  Negative for palpitations and leg swelling  Gastrointestinal: Negative for abdominal distention, abdominal pain, diarrhea, nausea and vomiting  Endocrine: Negative for polydipsia, polyphagia and polyuria  Musculoskeletal: Negative for gait problem and myalgias  Skin: Negative for color change, pallor and rash  Allergic/Immunologic: Negative for environmental allergies, food allergies and immunocompromised state  Neurological: Negative for dizziness, seizures, syncope and light-headedness  Hematological: Negative for adenopathy  Does not bruise/bleed easily  Psychiatric/Behavioral: Negative for dysphoric mood  The patient is not nervous/anxious          Physical Exam:  Vitals:    02/20/19 1153 BP: 108/60   BP Location: Left arm   Patient Position: Sitting   Cuff Size: Standard   Pulse: 62   Weight: 69 9 kg (154 lb)   Height: 5' 11" (1 803 m)     Physical Exam   Constitutional: He is oriented to person, place, and time  He appears well-developed  No distress  HENT:   Head: Normocephalic and atraumatic  Eyes: Pupils are equal, round, and reactive to light  Conjunctivae and EOM are normal    Neck: Neck supple  No JVD present  No thyromegaly present  Cardiovascular: Normal rate, regular rhythm, normal heart sounds and intact distal pulses  Exam reveals no gallop and no friction rub  No murmur heard  Pulmonary/Chest: Effort normal and breath sounds normal    Abdominal: Soft  He exhibits no distension  There is no tenderness  Musculoskeletal: He exhibits no edema  Neurological: He is alert and oriented to person, place, and time  No cranial nerve deficit  Skin: Skin is warm and dry  No rash noted  He is not diaphoretic  No erythema  Psychiatric: He has a normal mood and affect  His behavior is normal  Judgment and thought content normal        Labs:  Lab Results   Component Value Date    K 4 1 01/03/2019     01/03/2019    CO2 26 01/03/2019    BUN 15 01/03/2019    CREATININE 0 97 01/03/2019    CALCIUM 8 8 01/03/2019     Lab Results   Component Value Date    WBC 7 16 01/03/2019    HGB 14 8 01/03/2019    HCT 44 7 01/03/2019    MCV 96 01/03/2019     01/03/2019     No results found for: CHOL, TRIG, HDL, LDLDIRECT  Imaging: No results found  EKG (independently reviewed): NSR with no ST abnormalities    Discussion/Summary:  1  Chest pain, unspecified type      - Patient has atypical chest pain that is likely non-cardiac in etiology  - Discussed with patient in detail    Will obtain holter monitor and 2D echocardiogram for further evaluation   - Discussed risk factor reduction including refraining from smoking, eating a diet high in fruits and vegetables, maintaining a healthy weight, limiting screen time along with controlling BP and cholesterol  Encouraged to exercise 150 minutes a week at a moderate level such as a fast walk or 75 minutes of high intensity

## 2019-02-21 PROCEDURE — 93000 ELECTROCARDIOGRAM COMPLETE: CPT | Performed by: INTERNAL MEDICINE

## 2019-03-19 ENCOUNTER — OFFICE VISIT (OUTPATIENT)
Dept: FAMILY MEDICINE CLINIC | Facility: CLINIC | Age: 24
End: 2019-03-19
Payer: COMMERCIAL

## 2019-03-19 VITALS
OXYGEN SATURATION: 99 % | RESPIRATION RATE: 18 BRPM | SYSTOLIC BLOOD PRESSURE: 110 MMHG | TEMPERATURE: 98.4 F | BODY MASS INDEX: 22.04 KG/M2 | HEART RATE: 72 BPM | WEIGHT: 158 LBS | DIASTOLIC BLOOD PRESSURE: 68 MMHG

## 2019-03-19 DIAGNOSIS — J02.9 ACUTE PHARYNGITIS, UNSPECIFIED ETIOLOGY: Primary | ICD-10-CM

## 2019-03-19 DIAGNOSIS — Z28.21 INFLUENZA VACCINATION DECLINED: ICD-10-CM

## 2019-03-19 PROCEDURE — 99213 OFFICE O/P EST LOW 20 MIN: CPT | Performed by: FAMILY MEDICINE

## 2019-03-19 NOTE — ASSESSMENT & PLAN NOTE
· Patient advised to adequately hydrate and rest   Cough drops to soothe throat or intake of liquids with honey  Patient advised to return if symptoms exacerbate or do not subside

## 2019-03-19 NOTE — PROGRESS NOTES
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, tobacco abuse (1 pack per day, smoking since 10years old), marijuana use, and cocaine use in past presents for acute visit  Patient complains of sore throat  Associated symptoms include chills, dry cough, hoarseness, myalgias, pain while swallowing and sore throat  No rashes  Onset of symptoms was 1 day ago, and have been rapidly improving since that time  He is drinking moderate amounts of fluids  He has not had recent close exposure to someone with proven streptococcal pharyngitis  Possible sick contact with 8 y o  nephew  Review of Systems      As noted in HPI    Objective:      /68   Pulse 72   Temp 98 4 °F (36 9 °C)   Resp 18   Wt 71 7 kg (158 lb)   SpO2 99%   BMI 22 04 kg/m²        Physical Exam   Constitutional: He is oriented to person, place, and time  He appears well-developed and well-nourished  No distress  HENT:   Head: Normocephalic and atraumatic  Right Ear: Hearing and ear canal normal    Left Ear: Hearing and ear canal normal    Mouth/Throat: Uvula is midline, oropharynx is clear and moist and mucous membranes are normal  Cyanotic mucous membranes:    No oropharyngeal exudate  No tonsillar exudate  Throat erythematous   Eyes: Pupils are equal, round, and reactive to light  EOM are normal    Neck: Normal range of motion  Neck supple  Cardiovascular: Normal rate, regular rhythm, normal heart sounds and intact distal pulses  Pulmonary/Chest: Effort normal and breath sounds normal    Lymphadenopathy:     He has no cervical adenopathy  Neurological: He is alert and oriented to person, place, and time  Skin: Skin is warm and dry  Capillary refill takes less than 2 seconds  No rash noted     Psychiatric: His behavior is normal

## 2019-03-19 NOTE — LETTER
March 19, 2019     Patient: Kimberley Mosqueda   YOB: 1995   Date of Visit: 3/19/2019       To Whom it May Concern:    Kimberley Mosqueda is under my professional care  He was seen in my office on 3/19/2019  He can go back to work 03/20/2019  Please excuse 03/19/2019    If you have any questions or concerns, please don't hesitate to call           Sincerely,          Drexel Frankel, DO        CC: No Recipients

## 2019-03-19 NOTE — PATIENT INSTRUCTIONS
Pharyngitis, Ambulatory Care   GENERAL INFORMATION:   Pharyngitis  is swelling of the inside of your throat, called the pharynx  Pharyngitis is often caused by a cold or flu virus  It may also be caused by bacteria such as strep  Other causes include smoking, a runny nose, allergies, or acid reflux  Common symptoms include the following:   · Sore throat or pain when you swallow    · Fever, chills, and body aches    · Hoarse or raspy voice    · Cough, runny or stuffy nose, itchy or watery eyes    · Headache    · Upset stomach and loss of appetite    · Mild neck stiffness    · Swollen glands that feel like hard lumps when you touch your neck    · White and yellow pus-filled blisters in the back of your throat  Seek immediate care for the following symptoms:   · A painful lump in your throat that does not go away after 5 days    · Blood in your throat or ear    · Fever higher than 102? F (39?C) or lasts longer than 3 days    · Confusion    · Trouble breathing or swallowing  Treatment for pharyngitis  may include antibiotics if your sore throat is caused by bacteria  Viral pharyngitis will go away on its own without treatment  Your sore throat should start to feel better within 3 to 5 days for both viral and bacterial infections  Pain medicine may also be given to decrease your sore throat pain  Manage your symptoms:   · Gargle with salt water  to help reduce swelling in your throat  Mix ¼ teaspoon salt with 1 cup of warm water and gargle  · Drink more liquids  to help prevent dehydration  Cold or warm drinks may help soothe your throat  · Humidify your room  with a cool-steam humidifier to help moisten the air in your room and calm your cough  · Soothe your throat  with cough drops, ice, soft foods, or popsicles  · Rest your throat as much as possible  Try not to use your voice to decrease throat irritation  Prevent the spread of germs  by washing your hands with soap and warm water   Pharyngitis spreads easily from one person to another  Do not share food or drinks  Follow up with your healthcare provider as directed:  Write down your questions so you remember to ask them during your visits  CARE AGREEMENT:   You have the right to help plan your care  Learn about your health condition and how it may be treated  Discuss treatment options with your caregivers to decide what care you want to receive  You always have the right to refuse treatment  The above information is an  only  It is not intended as medical advice for individual conditions or treatments  Talk to your doctor, nurse or pharmacist before following any medical regimen to see if it is safe and effective for you  © 2014 [address] is for End User's use only and may not be sold, redistributed or otherwise used for commercial purposes  All illustrations and images included in CareNotes® are the copyrighted property of A D A M [employer] Inc  or Clarence Castro

## 2019-06-03 ENCOUNTER — HOSPITAL ENCOUNTER (EMERGENCY)
Facility: HOSPITAL | Age: 24
Discharge: HOME | End: 2019-06-03
Attending: EMERGENCY MEDICINE | Admitting: EMERGENCY MEDICINE
Payer: COMMERCIAL

## 2019-06-03 ENCOUNTER — APPOINTMENT (EMERGENCY)
Dept: RADIOLOGY | Facility: HOSPITAL | Age: 24
End: 2019-06-03
Payer: COMMERCIAL

## 2019-06-03 VITALS
BODY MASS INDEX: 22.4 KG/M2 | OXYGEN SATURATION: 100 % | TEMPERATURE: 96.5 F | DIASTOLIC BLOOD PRESSURE: 69 MMHG | SYSTOLIC BLOOD PRESSURE: 118 MMHG | HEART RATE: 62 BPM | WEIGHT: 160 LBS | HEIGHT: 71 IN | RESPIRATION RATE: 16 BRPM

## 2019-06-03 DIAGNOSIS — R07.89 CHEST WALL PAIN: Primary | ICD-10-CM

## 2019-06-03 DIAGNOSIS — F41.9 ANXIETY: ICD-10-CM

## 2019-06-03 LAB
ANION GAP SERPL CALCULATED.3IONS-SCNC: 7 MMOL/L (ref 4–13)
BASOPHILS # BLD AUTO: 0.05 THOUSANDS/ΜL (ref 0–0.1)
BASOPHILS NFR BLD AUTO: 1 % (ref 0–1)
BUN SERPL-MCNC: 19 MG/DL (ref 5–25)
CALCIUM SERPL-MCNC: 8.6 MG/DL (ref 8.3–10.1)
CHLORIDE SERPL-SCNC: 104 MMOL/L (ref 100–108)
CO2 SERPL-SCNC: 28 MMOL/L (ref 21–32)
CREAT SERPL-MCNC: 0.93 MG/DL (ref 0.6–1.3)
EOSINOPHIL # BLD AUTO: 0.14 THOUSAND/ΜL (ref 0–0.61)
EOSINOPHIL NFR BLD AUTO: 2 % (ref 0–6)
ERYTHROCYTE [DISTWIDTH] IN BLOOD BY AUTOMATED COUNT: 12.2 % (ref 11.6–15.1)
GFR SERPL CREATININE-BSD FRML MDRD: 114 ML/MIN/1.73SQ M
GLUCOSE SERPL-MCNC: 84 MG/DL (ref 65–140)
HCT VFR BLD AUTO: 40.9 % (ref 36.5–49.3)
HGB BLD-MCNC: 13.8 G/DL (ref 12–17)
IMM GRANULOCYTES # BLD AUTO: 0.03 THOUSAND/UL (ref 0–0.2)
IMM GRANULOCYTES NFR BLD AUTO: 0 % (ref 0–2)
LYMPHOCYTES # BLD AUTO: 1.89 THOUSANDS/ΜL (ref 0.6–4.47)
LYMPHOCYTES NFR BLD AUTO: 28 % (ref 14–44)
MAGNESIUM SERPL-MCNC: 1.9 MG/DL (ref 1.6–2.6)
MCH RBC QN AUTO: 31.9 PG (ref 26.8–34.3)
MCHC RBC AUTO-ENTMCNC: 33.7 G/DL (ref 31.4–37.4)
MCV RBC AUTO: 95 FL (ref 82–98)
MONOCYTES # BLD AUTO: 0.8 THOUSAND/ΜL (ref 0.17–1.22)
MONOCYTES NFR BLD AUTO: 12 % (ref 4–12)
NEUTROPHILS # BLD AUTO: 3.92 THOUSANDS/ΜL (ref 1.85–7.62)
NEUTS SEG NFR BLD AUTO: 57 % (ref 43–75)
NRBC BLD AUTO-RTO: 0 /100 WBCS
PLATELET # BLD AUTO: 282 THOUSANDS/UL (ref 149–390)
PMV BLD AUTO: 11.3 FL (ref 8.9–12.7)
POTASSIUM SERPL-SCNC: 4.1 MMOL/L (ref 3.5–5.3)
RBC # BLD AUTO: 4.33 MILLION/UL (ref 3.88–5.62)
SODIUM SERPL-SCNC: 139 MMOL/L (ref 136–145)
TROPONIN I SERPL-MCNC: <0.02 NG/ML
TSH SERPL DL<=0.05 MIU/L-ACNC: 1.77 UIU/ML (ref 0.36–3.74)
WBC # BLD AUTO: 6.83 THOUSAND/UL (ref 4.31–10.16)

## 2019-06-03 PROCEDURE — 83735 ASSAY OF MAGNESIUM: CPT | Performed by: EMERGENCY MEDICINE

## 2019-06-03 PROCEDURE — 99285 EMERGENCY DEPT VISIT HI MDM: CPT

## 2019-06-03 PROCEDURE — 85025 COMPLETE CBC W/AUTO DIFF WBC: CPT | Performed by: EMERGENCY MEDICINE

## 2019-06-03 PROCEDURE — 93005 ELECTROCARDIOGRAM TRACING: CPT

## 2019-06-03 PROCEDURE — 96361 HYDRATE IV INFUSION ADD-ON: CPT

## 2019-06-03 PROCEDURE — 84443 ASSAY THYROID STIM HORMONE: CPT | Performed by: EMERGENCY MEDICINE

## 2019-06-03 PROCEDURE — 71045 X-RAY EXAM CHEST 1 VIEW: CPT

## 2019-06-03 PROCEDURE — 80048 BASIC METABOLIC PNL TOTAL CA: CPT | Performed by: EMERGENCY MEDICINE

## 2019-06-03 PROCEDURE — 96374 THER/PROPH/DIAG INJ IV PUSH: CPT

## 2019-06-03 PROCEDURE — 36415 COLL VENOUS BLD VENIPUNCTURE: CPT | Performed by: EMERGENCY MEDICINE

## 2019-06-03 PROCEDURE — 84484 ASSAY OF TROPONIN QUANT: CPT | Performed by: EMERGENCY MEDICINE

## 2019-06-03 RX ORDER — LORAZEPAM 1 MG/1
1 TABLET ORAL ONCE
Status: COMPLETED | OUTPATIENT
Start: 2019-06-03 | End: 2019-06-03

## 2019-06-03 RX ORDER — KETOROLAC TROMETHAMINE 30 MG/ML
15 INJECTION, SOLUTION INTRAMUSCULAR; INTRAVENOUS ONCE
Status: COMPLETED | OUTPATIENT
Start: 2019-06-03 | End: 2019-06-03

## 2019-06-03 RX ADMIN — SODIUM CHLORIDE 1000 ML: 0.9 INJECTION, SOLUTION INTRAVENOUS at 10:16

## 2019-06-03 RX ADMIN — KETOROLAC TROMETHAMINE 15 MG: 30 INJECTION, SOLUTION INTRAMUSCULAR at 10:20

## 2019-06-03 RX ADMIN — LORAZEPAM 1 MG: 1 TABLET ORAL at 10:20

## 2019-06-04 ENCOUNTER — OFFICE VISIT (OUTPATIENT)
Dept: FAMILY MEDICINE CLINIC | Facility: CLINIC | Age: 24
End: 2019-06-04
Payer: COMMERCIAL

## 2019-06-04 VITALS
SYSTOLIC BLOOD PRESSURE: 116 MMHG | WEIGHT: 160 LBS | RESPIRATION RATE: 18 BRPM | HEART RATE: 78 BPM | OXYGEN SATURATION: 98 % | DIASTOLIC BLOOD PRESSURE: 78 MMHG | BODY MASS INDEX: 22.4 KG/M2 | HEIGHT: 71 IN

## 2019-06-04 DIAGNOSIS — F41.9 ANXIETY: Primary | ICD-10-CM

## 2019-06-04 PROCEDURE — 99213 OFFICE O/P EST LOW 20 MIN: CPT | Performed by: FAMILY MEDICINE

## 2019-06-04 RX ORDER — HYDROXYZINE PAMOATE 25 MG/1
25 CAPSULE ORAL 3 TIMES DAILY PRN
Qty: 30 CAPSULE | Refills: 1 | Status: SHIPPED | OUTPATIENT
Start: 2019-06-04 | End: 2020-01-24 | Stop reason: ALTCHOICE

## 2019-06-05 ENCOUNTER — TELEPHONE (OUTPATIENT)
Dept: FAMILY MEDICINE CLINIC | Facility: CLINIC | Age: 24
End: 2019-06-05

## 2019-06-05 LAB
ATRIAL RATE: 65 BPM
P AXIS: 41 DEGREES
PR INTERVAL: 126 MS
QRS AXIS: 66 DEGREES
QRSD INTERVAL: 84 MS
QT INTERVAL: 392 MS
QTC INTERVAL: 407 MS
T WAVE AXIS: 37 DEGREES
VENTRICULAR RATE: 65 BPM

## 2019-06-05 PROCEDURE — 93010 ELECTROCARDIOGRAM REPORT: CPT | Performed by: INTERNAL MEDICINE

## 2019-06-07 PROBLEM — F41.9 ANXIETY: Status: ACTIVE | Noted: 2019-06-07

## 2019-06-27 ENCOUNTER — TELEPHONE (OUTPATIENT)
Dept: FAMILY MEDICINE CLINIC | Facility: CLINIC | Age: 24
End: 2019-06-27

## 2019-07-22 ENCOUNTER — OFFICE VISIT (OUTPATIENT)
Dept: URGENT CARE | Facility: CLINIC | Age: 24
End: 2019-07-22
Payer: COMMERCIAL

## 2019-07-22 VITALS
OXYGEN SATURATION: 97 % | WEIGHT: 152.9 LBS | TEMPERATURE: 100.3 F | RESPIRATION RATE: 18 BRPM | BODY MASS INDEX: 20.71 KG/M2 | HEIGHT: 72 IN | DIASTOLIC BLOOD PRESSURE: 56 MMHG | HEART RATE: 93 BPM | SYSTOLIC BLOOD PRESSURE: 100 MMHG

## 2019-07-22 DIAGNOSIS — J02.9 SORE THROAT: Primary | ICD-10-CM

## 2019-07-22 LAB — S PYO AG THROAT QL: POSITIVE

## 2019-07-22 PROCEDURE — 87880 STREP A ASSAY W/OPTIC: CPT | Performed by: FAMILY MEDICINE

## 2019-07-22 PROCEDURE — 99213 OFFICE O/P EST LOW 20 MIN: CPT | Performed by: FAMILY MEDICINE

## 2019-07-22 RX ORDER — PENICILLIN V POTASSIUM 500 MG/1
500 TABLET ORAL EVERY 12 HOURS
Qty: 20 TABLET | Refills: 0 | Status: SHIPPED | OUTPATIENT
Start: 2019-07-22 | End: 2019-08-01

## 2019-07-22 NOTE — LETTER
July 22, 2019     Patient: Elliot Ferro   YOB: 1995   Date of Visit: 7/22/2019       To Whom It May Concern:    Elliot Ferro was evaluated here on 07/22/2019  May return to work on 07/24/2019  Please excuse his absence  Was diagnosed with strep pharyngitis requiring antibiotics  If you have any questions or concerns, please don't hesitate to call           Sincerely,        Britt Cooper MD    CC: No Recipients

## 2019-07-22 NOTE — PROGRESS NOTES
[facility]        NAME: To Wade is a 30 y o  male  : 1995    MRN: [id number]  DATE: 2019  TIME: 9:55 AM    Assessment and Plan   Sore throat [J02 9]  1  Sore throat  POCT rapid strepA     Rapid strep positive  Started on 10 days of penicillin VK  Advised to gargle with warm salt water regularly for symptomatic relief  Patient Instructions     Follow up with PCP in 3-5 days  Proceed to  ER if symptoms worsen  Chief Complaint     Chief Complaint   Patient presents with    Sore Throat     Exposed to strep  Starte with fever (100) and sore throat yesterday  Has body aches and L ear pressure  No cough/nasal congestion  Has white on tonsils  Took Tylenol at 7:30  History of Present Illness       47-year-old male presents today due to sore throat which has persisted for the past 1 day  First noticed yesterday morning and was concern for strep pharyngitis because he had family members in close proximity who were recently diagnosed with it  Describes having intense sore throat, fevers and chills with generalized myalgias  Reports feeling mildly improved today  Review of Systems   Review of Systems   Constitutional: Positive for chills and fever  HENT: Positive for sore throat  Negative for congestion and rhinorrhea  Respiratory: Negative for cough  Musculoskeletal: Positive for myalgias  Allergic/Immunologic: Negative for environmental allergies           Current Medications       Current Outpatient Medications:     hydrOXYzine pamoate (VISTARIL) 25 mg capsule, Take 1 capsule (25 mg total) by mouth 3 (three) times a day as needed for itching (Patient not taking: Reported on 2019), Disp: 30 capsule, Rfl: 1    Current Allergies     Allergies as of 2019    (No Known Allergies)            The following portions of the patient's history were reviewed and updated as appropriate: allergies, current medications, past family history, past medical history, past social history, past surgical history and problem list      Past Medical History:   Diagnosis Date    Anxiety     ETOH abuse     Genital herpes 2011       Past Surgical History:   Procedure Laterality Date    APPENDECTOMY      KNEE SURGERY      x 4       Family History   Problem Relation Age of Onset    No Known Problems Mother     Hypertension Maternal Grandmother     Hypertension Maternal Grandfather     Heart disease Maternal Grandfather     Heart attack Maternal Grandfather     Hypertension Paternal Grandmother     Hypertension Paternal Grandfather          Medications have been verified  Objective   /56 (BP Location: Right arm, Patient Position: Sitting, Cuff Size: Standard)   Pulse 93   Temp 100 3 °F (37 9 °C) (Tympanic)   Resp 18   Ht 6' (1 829 m)   Wt 69 4 kg (152 lb 14 4 oz)   SpO2 97%   BMI 20 74 kg/m²        Physical Exam     Physical Exam   Constitutional: He is oriented to person, place, and time  He appears well-developed and well-nourished  Non-toxic appearance  He does not appear ill  He appears distressed  HENT:   Head: Normocephalic and atraumatic  Mouth/Throat: Uvula is midline  No oral lesions  No uvula swelling  Posterior oropharyngeal erythema present  Tonsils are 3+ on the right  Tonsils are 3+ on the left  Tonsillar exudate  Eyes: Pupils are equal, round, and reactive to light  Pulmonary/Chest: Effort normal    Neurological: He is alert and oriented to person, place, and time  Skin: Skin is warm  Psychiatric: He has a normal mood and affect  His behavior is normal    Nursing note and vitals reviewed

## 2020-01-24 ENCOUNTER — OFFICE VISIT (OUTPATIENT)
Dept: URGENT CARE | Facility: CLINIC | Age: 25
End: 2020-01-24
Payer: COMMERCIAL

## 2020-01-24 VITALS
WEIGHT: 160 LBS | RESPIRATION RATE: 16 BRPM | DIASTOLIC BLOOD PRESSURE: 88 MMHG | HEART RATE: 79 BPM | SYSTOLIC BLOOD PRESSURE: 141 MMHG | TEMPERATURE: 99.4 F | BODY MASS INDEX: 21.67 KG/M2 | HEIGHT: 72 IN | OXYGEN SATURATION: 97 %

## 2020-01-24 DIAGNOSIS — A60.01 HERPES SIMPLEX INFECTION OF PENIS: Primary | ICD-10-CM

## 2020-01-24 PROCEDURE — 99213 OFFICE O/P EST LOW 20 MIN: CPT | Performed by: PHYSICIAN ASSISTANT

## 2020-01-24 PROCEDURE — 3008F BODY MASS INDEX DOCD: CPT | Performed by: PHYSICIAN ASSISTANT

## 2020-01-24 RX ORDER — VALACYCLOVIR HYDROCHLORIDE 500 MG/1
500 TABLET, FILM COATED ORAL 2 TIMES DAILY
Qty: 6 TABLET | Refills: 0 | Status: SHIPPED | OUTPATIENT
Start: 2020-01-24 | End: 2020-01-27

## 2020-01-24 NOTE — PATIENT INSTRUCTIONS
Genital Herpes Simplex   WHAT YOU NEED TO KNOW:   Genital herpes is a sexually transmitted infection (STI) that is caused by the herpes simplex virus (HSV)  It is spread through oral, vaginal, or anal sex  It may be spread even if you do not see blisters  It can also be spread to other areas of your body, including your eyes, by touching open blisters  If you are pregnant, it may be spread to your baby while he is still in your womb or during vaginal delivery  Unprotected sex or sex with multiple partners increases your risk for genital herpes  DISCHARGE INSTRUCTIONS:   Call 911 for any of the following:   · You have trouble breathing  · You have a seizure  · Your neck is stiff  · You have trouble thinking clearly  Contact your healthcare provider if:   · You have chills or a fever  · You have painful blisters on your penis, vagina, anus, or mouth  · Fluid or blood is coming out of your genitals  · You have trouble urinating  · You think you are pregnant and you are bleeding from your vagina  · You have trouble chewing or swallowing  · Your symptoms do not get better, or they get worse, even after treatment  · You have questions or concerns about your condition or care  Medicines:   · Antivirals  may help decrease your symptoms  · Numbing cream or ointment  may help decrease pain  · NSAIDs , such as ibuprofen, help decrease swelling, pain, and fever  This medicine is available with or without a doctor's order  NSAIDs can cause stomach bleeding or kidney problems in certain people  If you take blood thinner medicine, always ask your healthcare provider if NSAIDs are safe for you  Always read the medicine label and follow directions  · Take your medicine as directed  Contact your healthcare provider if you think your medicine is not helping or if you have side effects  Tell him or her if you are allergic to any medicine   Keep a list of the medicines, vitamins, and herbs you take  Include the amounts, and when and why you take them  Bring the list or the pill bottles to follow-up visits  Carry your medicine list with you in case of an emergency  Manage your symptoms:  Do the following to be more comfortable when your infection is active:  · Keep the blisters clean and dry  Wash them with soap and warm water, and pat dry gently  · Wear cotton underwear and loose clothing  This may help to keep the blisters dry and keep clothes from rubbing  · Apply ice  on the area for 15 to 20 minutes every hour or as directed  Use an ice pack, or put crushed ice in a plastic bag  Cover it with a towel  Ice helps prevent tissue damage and decreases swelling and pain  · Apply heat  on the area for 20 to 30 minutes every 2 hours for as many days as directed  A warm bath may also help  Heat helps decrease pain and muscle spasms  Prevent the spread of genital herpes:   · Use condoms  Use a latex condom when you have oral, genital, and anal sex  Use a new condom each time  Use a polyurethane condom if you are allergic to latex  · Try not to touch your blisters  Wash your hands before and after you touch the area  Do not kiss anyone if you have blisters around your mouth  Do not breastfeed if you have blisters on your breast      · Tell your partners  that you have genital herpes  Do not have sex until he or she knows that you have genital herpes  Ask your healthcare provider for ways to tell partners about your infection  · Tell your healthcare providers  that you have genital herpes  If you are pregnant, your baby may need special monitoring  Inform your healthcare provider of your condition to avoid spreading the infection to your baby  Follow up with your healthcare provider as directed:  Write down your questions so you remember to ask them during your visits     © 2017 [employer] Information is for End User's use only and may not be sold, redistributed or otherwise used for commercial purposes  All illustrations and images included in CareNotes® are the copyrighted property of A D A M [employer] Inc  or Clarence Castro  The above information is an  only  It is not intended as medical advice for individual conditions or treatments  Talk to your doctor, nurse or pharmacist before following any medical regimen to see if it is safe and effective for you

## 2020-09-12 NOTE — PROGRESS NOTES
[facility]        NAME: Baudilio Avila is a 30 y o  male  : 1995    MRN: [id number]  DATE:  2020  TIME: 2:29 PM    Assessment and Plan   Herpes simplex infection of penis [A60 01]  1  Herpes simplex infection of penis  valACYclovir (VALTREX) 500 mg tablet         Patient Instructions     Discussed condition with patient  He is having an outbreak of herpes simplex infection on the penile shaft  I will prescribe Valtrex and discussed with him that he is considered highly contagious until outbreak is resolved  He voiced understanding  Follow up with PCP in 3-5 days  Proceed to  ER if symptoms worsen  Chief Complaint     Chief Complaint   Patient presents with    Herpes Zoster     Genital herpes outbreak started this afternoon  History of Present Illness       Pt presents with what he reports is an outbreak of genital herpes  He reports he has had a few flares previously but has not had 1 in some time  He has a painful but nondraining lesion on the penile shaft  Denies any other significant symptoms at this time  Review of Systems   Review of Systems   Constitutional: Negative  Respiratory: Negative  Cardiovascular: Negative  Gastrointestinal: Negative  Genitourinary: Positive for genital sores           Current Medications       Current Outpatient Medications:     valACYclovir (VALTREX) 500 mg tablet, Take 1 tablet (500 mg total) by mouth 2 (two) times a day for 3 days, Disp: 6 tablet, Rfl: 0    Current Allergies     Allergies as of 2020    (No Known Allergies)            The following portions of the patient's history were reviewed and updated as appropriate: allergies, current medications, past family history, past medical history, past social history, past surgical history and problem list      Past Medical History:   Diagnosis Date    Anxiety     ETOH abuse     Genital herpes        Past Surgical History:   Procedure Laterality Date  APPENDECTOMY      KNEE SURGERY      x 4       Family History   Problem Relation Age of Onset    No Known Problems Mother     Hypertension Maternal Grandmother     Hypertension Maternal Grandfather     Heart disease Maternal Grandfather     Heart attack Maternal Grandfather     Hypertension Paternal Grandmother     Hypertension Paternal Grandfather          Medications have been verified  Objective   /88   Pulse 79   Temp 99 4 °F (37 4 °C)   Resp 16   Ht 6' (1 829 m)   Wt 72 6 kg (160 lb)   SpO2 97%   BMI 21 70 kg/m²        Physical Exam     Physical Exam   Constitutional: He is oriented to person, place, and time  He appears well-developed and well-nourished  No distress  Genitourinary:   Genitourinary Comments: Penile shaft with nondraining vesicular herpetic lesion present  Remainder of exam is unremarkable  Neurological: He is alert and oriented to person, place, and time  Psychiatric: He has a normal mood and affect  Vitals reviewed  Never

## 2021-10-01 ENCOUNTER — TELEPHONE (OUTPATIENT)
Dept: FAMILY MEDICINE CLINIC | Facility: CLINIC | Age: 26
End: 2021-10-01

## 2021-12-31 ENCOUNTER — HOSPITAL ENCOUNTER (EMERGENCY)
Facility: HOSPITAL | Age: 26
Discharge: HOME | End: 2021-12-31
Attending: EMERGENCY MEDICINE
Payer: COMMERCIAL

## 2021-12-31 VITALS
OXYGEN SATURATION: 100 % | SYSTOLIC BLOOD PRESSURE: 118 MMHG | HEART RATE: 68 BPM | WEIGHT: 150 LBS | TEMPERATURE: 97.3 F | BODY MASS INDEX: 20.34 KG/M2 | RESPIRATION RATE: 18 BRPM | DIASTOLIC BLOOD PRESSURE: 66 MMHG

## 2021-12-31 DIAGNOSIS — R11.10 VOMITING: Primary | ICD-10-CM

## 2021-12-31 LAB
ALBUMIN SERPL BCP-MCNC: 4.5 G/DL (ref 3.5–5)
ALP SERPL-CCNC: 66 U/L (ref 46–116)
ALT SERPL W P-5'-P-CCNC: 39 U/L (ref 12–78)
ANION GAP SERPL CALCULATED.3IONS-SCNC: 12 MMOL/L (ref 4–13)
APTT PPP: 23 SECONDS (ref 23–37)
AST SERPL W P-5'-P-CCNC: 32 U/L (ref 5–45)
BASOPHILS # BLD AUTO: 0.1 THOUSANDS/ΜL (ref 0–0.1)
BASOPHILS NFR BLD AUTO: 1 % (ref 0–1)
BILIRUB SERPL-MCNC: 0.25 MG/DL (ref 0.2–1)
BUN SERPL-MCNC: 16 MG/DL (ref 5–25)
CALCIUM SERPL-MCNC: 9.3 MG/DL (ref 8.3–10.1)
CHLORIDE SERPL-SCNC: 107 MMOL/L (ref 100–108)
CO2 SERPL-SCNC: 26 MMOL/L (ref 21–32)
CREAT SERPL-MCNC: 1.11 MG/DL (ref 0.6–1.3)
EOSINOPHIL # BLD AUTO: 0.51 THOUSAND/ΜL (ref 0–0.61)
EOSINOPHIL NFR BLD AUTO: 6 % (ref 0–6)
ERYTHROCYTE [DISTWIDTH] IN BLOOD BY AUTOMATED COUNT: 12.6 % (ref 11.6–15.1)
ETHANOL SERPL-MCNC: <3 MG/DL (ref 0–3)
GFR SERPL CREATININE-BSD FRML MDRD: 89 ML/MIN/1.73SQ M
GLUCOSE SERPL-MCNC: 115 MG/DL (ref 65–140)
HCT VFR BLD AUTO: 41.7 % (ref 36.5–49.3)
HGB BLD-MCNC: 13.9 G/DL (ref 12–17)
IMM GRANULOCYTES # BLD AUTO: 0.02 THOUSAND/UL (ref 0–0.2)
IMM GRANULOCYTES NFR BLD AUTO: 0 % (ref 0–2)
INR PPP: 0.9 (ref 0.84–1.19)
LIPASE SERPL-CCNC: 84 U/L (ref 73–393)
LYMPHOCYTES # BLD AUTO: 2.99 THOUSANDS/ΜL (ref 0.6–4.47)
LYMPHOCYTES NFR BLD AUTO: 38 % (ref 14–44)
MCH RBC QN AUTO: 31.6 PG (ref 26.8–34.3)
MCHC RBC AUTO-ENTMCNC: 33.3 G/DL (ref 31.4–37.4)
MCV RBC AUTO: 95 FL (ref 82–98)
MONOCYTES # BLD AUTO: 0.73 THOUSAND/ΜL (ref 0.17–1.22)
MONOCYTES NFR BLD AUTO: 9 % (ref 4–12)
NEUTROPHILS # BLD AUTO: 3.59 THOUSANDS/ΜL (ref 1.85–7.62)
NEUTS SEG NFR BLD AUTO: 46 % (ref 43–75)
NRBC BLD AUTO-RTO: 0 /100 WBCS
PLATELET # BLD AUTO: 307 THOUSANDS/UL (ref 149–390)
PMV BLD AUTO: 11 FL (ref 8.9–12.7)
POTASSIUM SERPL-SCNC: 4.3 MMOL/L (ref 3.5–5.3)
PROT SERPL-MCNC: 7.7 G/DL (ref 6.4–8.2)
PROTHROMBIN TIME: 12 SECONDS (ref 11.6–14.5)
RBC # BLD AUTO: 4.4 MILLION/UL (ref 3.88–5.62)
SODIUM SERPL-SCNC: 145 MMOL/L (ref 136–145)
WBC # BLD AUTO: 7.94 THOUSAND/UL (ref 4.31–10.16)

## 2021-12-31 PROCEDURE — 85730 THROMBOPLASTIN TIME PARTIAL: CPT | Performed by: EMERGENCY MEDICINE

## 2021-12-31 PROCEDURE — 83690 ASSAY OF LIPASE: CPT | Performed by: EMERGENCY MEDICINE

## 2021-12-31 PROCEDURE — 96361 HYDRATE IV INFUSION ADD-ON: CPT

## 2021-12-31 PROCEDURE — 82077 ASSAY SPEC XCP UR&BREATH IA: CPT | Performed by: EMERGENCY MEDICINE

## 2021-12-31 PROCEDURE — 96372 THER/PROPH/DIAG INJ SC/IM: CPT

## 2021-12-31 PROCEDURE — 99284 EMERGENCY DEPT VISIT MOD MDM: CPT | Performed by: EMERGENCY MEDICINE

## 2021-12-31 PROCEDURE — 36415 COLL VENOUS BLD VENIPUNCTURE: CPT | Performed by: EMERGENCY MEDICINE

## 2021-12-31 PROCEDURE — 96374 THER/PROPH/DIAG INJ IV PUSH: CPT

## 2021-12-31 PROCEDURE — 80053 COMPREHEN METABOLIC PANEL: CPT | Performed by: EMERGENCY MEDICINE

## 2021-12-31 PROCEDURE — 99284 EMERGENCY DEPT VISIT MOD MDM: CPT

## 2021-12-31 PROCEDURE — 85610 PROTHROMBIN TIME: CPT | Performed by: EMERGENCY MEDICINE

## 2021-12-31 PROCEDURE — 85025 COMPLETE CBC W/AUTO DIFF WBC: CPT | Performed by: EMERGENCY MEDICINE

## 2021-12-31 RX ORDER — ONDANSETRON 2 MG/ML
INJECTION INTRAMUSCULAR; INTRAVENOUS
Status: COMPLETED
Start: 2021-12-31 | End: 2021-12-31

## 2021-12-31 RX ORDER — ONDANSETRON 4 MG/1
4 TABLET, FILM COATED ORAL EVERY 6 HOURS
Qty: 10 TABLET | Refills: 0 | Status: SHIPPED | OUTPATIENT
Start: 2021-12-31 | End: 2022-01-03

## 2021-12-31 RX ORDER — ONDANSETRON 2 MG/ML
4 INJECTION INTRAMUSCULAR; INTRAVENOUS ONCE
Status: COMPLETED | OUTPATIENT
Start: 2021-12-31 | End: 2021-12-31

## 2021-12-31 RX ORDER — OLANZAPINE 10 MG/1
10 INJECTION, POWDER, LYOPHILIZED, FOR SOLUTION INTRAMUSCULAR ONCE
Status: COMPLETED | OUTPATIENT
Start: 2021-12-31 | End: 2021-12-31

## 2021-12-31 RX ADMIN — OLANZAPINE 10 MG: 10 INJECTION, POWDER, FOR SOLUTION INTRAMUSCULAR at 07:24

## 2021-12-31 RX ADMIN — SODIUM CHLORIDE 1000 ML: 0.9 INJECTION, SOLUTION INTRAVENOUS at 07:25

## 2021-12-31 RX ADMIN — ONDANSETRON 4 MG: 2 INJECTION INTRAMUSCULAR; INTRAVENOUS at 07:00

## 2021-12-31 NOTE — ED PROVIDER NOTES
History  Chief Complaint   Patient presents with    Vomiting - Severe     patient c/o vomiting blood, does admit to drinking whiskey sours and smoking Avie Mini last night     Patient has a history of alcohol abuse  States she was drinking earlier this morning 2 hours prior to arrival   Patient was also smoking marijuana  States approximately 1 hour prior to arrival he developed forceful vomiting multiple times  No diarrhea  No fever  Patient states this has occurred in the past in a similar manner  He arrives awake alert, is quite anxious states he is unable to stay still  Prior to Admission Medications   Prescriptions Last Dose Informant Patient Reported? Taking?   valACYclovir (VALTREX) 500 mg tablet   No No   Sig: Take 1 tablet (500 mg total) by mouth 2 (two) times a day for 3 days      Facility-Administered Medications: None       Past Medical History:   Diagnosis Date    Anxiety     ETOH abuse     Genital herpes 2011       Past Surgical History:   Procedure Laterality Date    APPENDECTOMY      KNEE SURGERY      x 4    POPLITEAL SYNOVIAL CYST EXCISION      knee       Family History   Problem Relation Age of Onset    No Known Problems Mother     Hypertension Maternal Grandmother     Hypertension Maternal Grandfather     Heart disease Maternal Grandfather     Heart attack Maternal Grandfather     Hypertension Paternal Grandmother     Hypertension Paternal Grandfather     No Known Problems Father      I have reviewed and agree with the history as documented  E-Cigarette/Vaping    E-Cigarette Use Current Every Day User      E-Cigarette/Vaping Substances    Nicotine Yes      Social History     Tobacco Use    Smoking status: Current Every Day Smoker     Types: Cigarettes    Smokeless tobacco: Never Used    Tobacco comment: 3 cigarettes/day   Vaping Use    Vaping Use: Every day    Substances: Nicotine   Substance Use Topics    Alcohol use:  Yes     Alcohol/week: 4 0 standard drinks     Types: 4 Shots of liquor per week    Drug use: Yes     Frequency: 21 0 times per week     Types: Marijuana     Comment: smoked last night       Review of Systems   Constitutional: Negative for chills and fever  HENT: Negative for congestion and sore throat  Eyes: Negative for visual disturbance  Respiratory: Negative for shortness of breath  Cardiovascular: Negative for chest pain  Gastrointestinal: Positive for abdominal pain, nausea and vomiting  Negative for diarrhea  Genitourinary: Negative for dysuria  Musculoskeletal: Negative for back pain  Skin: Negative for rash  Neurological: Negative for light-headedness and headaches  Hematological: Does not bruise/bleed easily  Psychiatric/Behavioral: Negative for confusion  The patient is nervous/anxious  All other systems reviewed and are negative  Physical Exam  Physical Exam  Vitals and nursing note reviewed  Constitutional:       General: He is in acute distress  Appearance: Normal appearance  HENT:      Head: Normocephalic  Right Ear: External ear normal       Left Ear: External ear normal       Nose: Nose normal       Mouth/Throat:      Pharynx: Oropharynx is clear  Eyes:      Conjunctiva/sclera: Conjunctivae normal    Cardiovascular:      Rate and Rhythm: Normal rate and regular rhythm  Pulses: Normal pulses  Pulmonary:      Effort: Pulmonary effort is normal       Breath sounds: Normal breath sounds  Abdominal:      Palpations: Abdomen is soft  Tenderness: There is abdominal tenderness  Musculoskeletal:         General: Normal range of motion  Cervical back: Normal range of motion and neck supple  Skin:     General: Skin is warm and dry  Capillary Refill: Capillary refill takes less than 2 seconds  Neurological:      General: No focal deficit present  Mental Status: He is alert     Psychiatric:      Comments: Patient is anxious and agitated rolling around on the stretcher, poorly cooperative with exam         Vital Signs  ED Triage Vitals   Temperature Pulse Respirations Blood Pressure SpO2   12/31/21 0658 12/31/21 0655 12/31/21 0655 12/31/21 0655 12/31/21 0655   (!) 97 3 °F (36 3 °C) 100 18 140/85 100 %      Temp src Heart Rate Source Patient Position - Orthostatic VS BP Location FiO2 (%)   -- -- -- 12/31/21 0655 --      Right arm       Pain Score       --                  Vitals:    12/31/21 0655 12/31/21 0830   BP: 140/85    Pulse: 100 60         Visual Acuity      ED Medications  Medications   sodium chloride 0 9 % bolus 1,000 mL (1,000 mL Intravenous New Bag 12/31/21 0725)   OLANZapine (ZyPREXA) IM injection 10 mg (10 mg Intramuscular Given 12/31/21 0724)   ondansetron (ZOFRAN) injection 4 mg (4 mg Intravenous Given 12/31/21 0700)       Diagnostic Studies  Results Reviewed     Procedure Component Value Units Date/Time    Ethanol [[id number]]  (Normal) Collected: 12/31/21 0727    Lab Status: Final result Specimen: Blood from Arm, Left Updated: 12/31/21 0755     Ethanol Lvl <3 mg/dL     Comprehensive metabolic panel [[id number]] Collected: 12/31/21 0727    Lab Status: Final result Specimen: Blood from Arm, Left Updated: 12/31/21 0754     Sodium 145 mmol/L      Potassium 4 3 mmol/L      Chloride 107 mmol/L      CO2 26 mmol/L      ANION GAP 12 mmol/L      BUN 16 mg/dL      Creatinine 1 11 mg/dL      Glucose 115 mg/dL      Calcium 9 3 mg/dL      AST 32 U/L      ALT 39 U/L      Alkaline Phosphatase 66 U/L      Total Protein 7 7 g/dL      Albumin 4 5 g/dL      Total Bilirubin 0 25 mg/dL      eGFR 89 ml/min/1 73sq m     Narrative:      Rachel guidelines for Chronic Kidney Disease (CKD):     Stage 1 with normal or high GFR (GFR > 90 mL/min/1 73 square meters)    Stage 2 Mild CKD (GFR = 60-89 mL/min/1 73 square meters)    Stage 3A Moderate CKD (GFR = 45-59 mL/min/1 73 square meters)    Stage 3B Moderate CKD (GFR = 30-44 mL/min/1 73 square meters)   Stage 4 Severe CKD (GFR = 15-29 mL/min/1 73 square meters)    Stage 5 End Stage CKD (GFR <15 mL/min/1 73 square meters)  Note: GFR calculation is accurate only with a steady state creatinine    Lipase [[id number]]  (Normal) Collected: 12/31/21 0727    Lab Status: Final result Specimen: Blood from Arm, Left Updated: 12/31/21 0754     Lipase 84 u/L     Protime-INR [[id number]]  (Normal) Collected: 12/31/21 0727    Lab Status: Final result Specimen: Blood from Arm, Left Updated: 12/31/21 0745     Protime 12 0 seconds      INR 0 90    APTT [[id number]]  (Normal) Collected: 12/31/21 0727    Lab Status: Final result Specimen: Blood from Arm, Left Updated: 12/31/21 0745     PTT 23 seconds     CBC and differential [[id number]] Collected: 12/31/21 0727    Lab Status: Final result Specimen: Blood from Arm, Left Updated: 12/31/21 0733     WBC 7 94 Thousand/uL      RBC 4 40 Million/uL      Hemoglobin 13 9 g/dL      Hematocrit 41 7 %      MCV 95 fL      MCH 31 6 pg      MCHC 33 3 g/dL      RDW 12 6 %      MPV 11 0 fL      Platelets 830 Thousands/uL      nRBC 0 /100 WBCs      Neutrophils Relative 46 %      Immat GRANS % 0 %      Lymphocytes Relative 38 %      Monocytes Relative 9 %      Eosinophils Relative 6 %      Basophils Relative 1 %      Neutrophils Absolute 3 59 Thousands/µL      Immature Grans Absolute 0 02 Thousand/uL      Lymphocytes Absolute 2 99 Thousands/µL      Monocytes Absolute 0 73 Thousand/µL      Eosinophils Absolute 0 51 Thousand/µL      Basophils Absolute 0 10 Thousands/µL                  No orders to display              Procedures  Procedures         ED Course                                             MDM  Number of Diagnoses or Management Options  Diagnosis management comments: Patient may have cyclic vomiting  Will check pancreatitis    Hydrate and provide antiemetics      Disposition  Final diagnoses:   Vomiting     Time reflects when diagnosis was documented in both MDM as applicable and the Disposition within this note     Time User Action Codes Description Comment    12/31/2021  9:13 AM Levi WYATT Add [R11 10] Vomiting       ED Disposition     ED Disposition Condition Date/Time Comment    Discharge Stable Fri Dec 31, 2021  9:13 AM Shanelle May discharge to home/self care  Follow-up Information     Follow up With Specialties Details Why Contact Info    [employer]  Schedule an appointment as soon as possible for a visit   [phone]            Patient's Medications   Discharge Prescriptions    ONDANSETRON (ZOFRAN) 4 MG TABLET    Take 1 tablet (4 mg total) by mouth every 6 (six) hours for 3 days       Start Date: 12/31/2021End Date: 1/3/2022       Order Dose: 4 mg       Quantity: 10 tablet    Refills: 0       No discharge procedures on file      PDMP Review     None          ED Provider  Electronically Signed by           Lorri Plunkett MD  12/31/21 6450

## 2022-07-01 ENCOUNTER — HOSPITAL ENCOUNTER (OUTPATIENT)
Dept: RADIOLOGY | Facility: HOSPITAL | Age: 27
Discharge: HOME | End: 2022-07-01
Attending: SPECIALIST
Payer: COMMERCIAL

## 2022-07-01 DIAGNOSIS — N50.82 SCROTAL PAIN: ICD-10-CM

## 2022-07-01 PROCEDURE — 76870 US EXAM SCROTUM: CPT

## 2022-07-27 ENCOUNTER — EVALUATION (OUTPATIENT)
Dept: OCCUPATIONAL THERAPY | Facility: CLINIC | Age: 27
End: 2022-07-27
Payer: COMMERCIAL

## 2022-07-27 DIAGNOSIS — M67.431 GANGLION CYST OF DORSUM OF RIGHT WRIST: Primary | ICD-10-CM

## 2022-07-27 PROCEDURE — 97165 OT EVAL LOW COMPLEX 30 MIN: CPT

## 2022-07-27 NOTE — PROGRESS NOTES
OT Evaluation     Today's date: 2022  Patient name: Joey Brown  : 1995  MRN: [id number]  Referring provider: Galina Grey  Dx:   Encounter Diagnosis     ICD-10-CM    1  Ganglion cyst of dorsum of right wrist  M67 431        Start Time: 454  Stop Time: 1750  Total time in clinic (min): 25 minutes    Assessment  Assessment details: Patient presented s/p ganglion cyst removal dorsal right wrist  No restrictions at this time  Joey Brown is a 32 y o  male who presents s/p ganglion cyst excision of right wrist  Patient tolerated session well  Provided home exercise program for stretches, nerve glides, scar management, and desensitization  Patient was able to demonstrate home program past instruction with use of handouts  Patient advised to contact doctor if there is a change of status  Patient advised to discontinue any exercise which cause increased pain  Patient is a good candidate to benefit from skilled occupational therapy to address impairments and return to maximal level of function with minimal symptoms      Impairments: abnormal or restricted ROM, impaired physical strength, lacks appropriate home exercise program and pain with function  Understanding of Dx/Px/POC: good   Prognosis: good    Goals  Patient to demonstrate understanding of home exercise program in 2 weeks for decreased pain with activities of daily living  Patient to demonstrate understanding of final discharge home exercise program by time of discharge    Plan  Patient would benefit from: OT eval and skilled occupational therapy  Planned modality interventions: ultrasound and thermotherapy: hydrocollator packs  Planned therapy interventions: joint mobilization, manual therapy, massage, strengthening, stretching, therapeutic activities, therapeutic exercise, fine motor coordination training, flexibility, functional ROM exercises, home exercise program, activity modification, neuromuscular re-education, patient education, graded activity and graded exercise  Frequency: 1x week  Duration in visits: 1  Treatment plan discussed with: patient        Subjective Evaluation    History of Present Illness  Date of surgery: 2022  Mechanism of injury: Patient presented s/p ganglion cyst removal dorsal right wrist  No restrictions at this time  Patient reported some numbness dorsal hand and he reported that with palpation to radial of scar patient reported discomfort in his distal dorsal hand  Patient reported a snapping and pain with full arm and wrist extension  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  Quality of life: good    Pain  Current pain ratin  At best pain ratin  At worst pain ratin  Quality: sharp and discomfort  Progression: improved    Social Support    Employment status: working (tree work   Hobbies: yard work)  Hand dominance: right    Patient Goals  Patient goals for therapy: decreased pain and increased strength          Objective     Observations     Right Wrist/Hand   Positive for edema       Neurological Testing     Sensation     Wrist/Hand     Right   Diminished: light touch    Comments   Right light touch: Diminished light touch just proximal to dorsal 3rd-4th digit MPs    Active Range of Motion     Left Wrist   Wrist flexion: 74 degrees   Wrist extension: 72 degrees     Right Wrist   Wrist flexion: 66 degrees   Wrist extension: 62 degrees     Strength/Myotome Testing     Left Wrist/Hand      (2nd hand position)     Trial 1: 130    Right Wrist/Hand      (2nd hand position)     Trial 1: 125    Swelling     Left Wrist/Hand   Circumference wrist: 17 4 cm    Right Wrist/Hand   Circumference wrist: 17 9 cm             Precautions: Universal    Manuals                                                                 Neuro Re-Ed                                                                                                        Ther Ex             Wrist flexor/extensor stretch x10            AROM wrist x10            STM 2'            Radial nerve glides x10            Desensitization HEP                                                   Ther Activity                                                                              Modalities

## 2022-07-27 NOTE — LETTER
2022    Arjun Britt  [address] [address]    Patient: Cathy Kern   YOB: 1995   Date of Visit: 2022     Encounter Diagnosis     ICD-10-CM    1  Ganglion cyst of dorsum of right wrist  M67 431        Dear Dr Ana Cristina Barriga: Thank you for your recent referral of Cathy Kern  Please review the attached evaluation summary from Fercho's recent visit  Please verify that you agree with the plan of care by signing the attached order  If you have any questions or concerns, please do not hesitate to call  I sincerely appreciate the opportunity to share in the care of one of your patients and hope to have another opportunity to work with you in the near future  Sincerely,    Godfrey Finn, OT      Referring Provider:     I certify that I have read the below Plan of Care and certify the need for these services furnished under this plan of treatment while under my care  Arjun Britt  [address] [address]  Via Fax: [phone]        OT Evaluation     Today's date: 2022  Patient name: Cathy Kern  : 1995  MRN: [id number]  Referring provider: Jairon Moreno  Dx:   Encounter Diagnosis     ICD-10-CM    1  Ganglion cyst of dorsum of right wrist  M67 431        Start Time: 412  Stop Time: 1750  Total time in clinic (min): 25 minutes    Assessment  Assessment details: Patient presented s/p ganglion cyst removal dorsal right wrist  No restrictions at this time  Cathy Kern is a 32 y o  male who presents s/p ganglion cyst excision of right wrist  Patient tolerated session well  Provided home exercise program for stretches, nerve glides, scar management, and desensitization  Patient was able to demonstrate home program past instruction with use of handouts  Patient advised to contact doctor if there is a change of status  Patient advised to discontinue any exercise which cause increased pain   Patient is a good candidate to benefit from skilled occupational therapy to address impairments and return to maximal level of function with minimal symptoms  Impairments: abnormal or restricted ROM, impaired physical strength, lacks appropriate home exercise program and pain with function  Understanding of Dx/Px/POC: good   Prognosis: good    Goals  Patient to demonstrate understanding of home exercise program in 2 weeks for decreased pain with activities of daily living  Patient to demonstrate understanding of final discharge home exercise program by time of discharge    Plan  Patient would benefit from: OT eval and skilled occupational therapy  Planned modality interventions: ultrasound and thermotherapy: hydrocollator packs  Planned therapy interventions: joint mobilization, manual therapy, massage, strengthening, stretching, therapeutic activities, therapeutic exercise, fine motor coordination training, flexibility, functional ROM exercises, home exercise program, activity modification, neuromuscular re-education, patient education, graded activity and graded exercise  Frequency: 1x week  Duration in visits: 1  Treatment plan discussed with: patient        Subjective Evaluation    History of Present Illness  Date of surgery: 2022  Mechanism of injury: Patient presented s/p ganglion cyst removal dorsal right wrist  No restrictions at this time  Patient reported some numbness dorsal hand and he reported that with palpation to radial of scar patient reported discomfort in his distal dorsal hand  Patient reported a snapping and pain with full arm and wrist extension   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  Quality of life: good    Pain  Current pain ratin  At best pain ratin  At worst pain ratin  Quality: sharp and discomfort  Progression: improved    Social Support    Employment status: working (tree work   Hobbies: yard work)  Hand dominance: right    Patient Goals  Patient goals for therapy: decreased pain and increased strength          Objective     Observations     Right Wrist/Hand   Positive for edema       Neurological Testing     Sensation     Wrist/Hand     Right   Diminished: light touch    Comments   Right light touch: Diminished light touch just proximal to dorsal 3rd-4th digit MPs    Active Range of Motion     Left Wrist   Wrist flexion: 74 degrees   Wrist extension: 72 degrees     Right Wrist   Wrist flexion: 66 degrees   Wrist extension: 62 degrees     Strength/Myotome Testing     Left Wrist/Hand      (2nd hand position)     Trial 1: 130    Right Wrist/Hand      (2nd hand position)     Trial 1: 125    Swelling     Left Wrist/Hand   Circumference wrist: 17 4 cm    Right Wrist/Hand   Circumference wrist: 17 9 cm             Precautions: Universal    Manuals 7/27                                                                Neuro Re-Ed                                                                                                        Ther Ex             Wrist flexor/extensor stretch x10            AROM wrist x10            STM 2'            Radial nerve glides x10            Desensitization HEP                                                   Ther Activity                                                                              Modalities

## 2023-02-06 ENCOUNTER — APPOINTMENT (EMERGENCY)
Dept: RADIOLOGY | Facility: HOSPITAL | Age: 28
End: 2023-02-06

## 2023-02-06 ENCOUNTER — HOSPITAL ENCOUNTER (EMERGENCY)
Facility: HOSPITAL | Age: 28
Discharge: HOME | End: 2023-02-06
Attending: EMERGENCY MEDICINE

## 2023-02-06 VITALS
RESPIRATION RATE: 20 BRPM | BODY MASS INDEX: 20.32 KG/M2 | WEIGHT: 150 LBS | HEIGHT: 72 IN | HEART RATE: 68 BPM | DIASTOLIC BLOOD PRESSURE: 62 MMHG | SYSTOLIC BLOOD PRESSURE: 135 MMHG | TEMPERATURE: 98 F | OXYGEN SATURATION: 98 %

## 2023-02-06 DIAGNOSIS — R10.9 ACUTE RIGHT FLANK PAIN: ICD-10-CM

## 2023-02-06 DIAGNOSIS — R11.2 NAUSEA VOMITING AND DIARRHEA: Primary | ICD-10-CM

## 2023-02-06 DIAGNOSIS — M54.9 MUSCULOSKELETAL BACK PAIN: ICD-10-CM

## 2023-02-06 DIAGNOSIS — F12.90 MARIJUANA USER: ICD-10-CM

## 2023-02-06 DIAGNOSIS — R19.7 NAUSEA VOMITING AND DIARRHEA: Primary | ICD-10-CM

## 2023-02-06 LAB
ALBUMIN SERPL BCP-MCNC: 4.8 G/DL (ref 3.5–5)
ALP SERPL-CCNC: 65 U/L (ref 46–116)
ALT SERPL W P-5'-P-CCNC: 19 U/L (ref 12–78)
AMPHETAMINES SERPL QL SCN: NEGATIVE
ANION GAP SERPL CALCULATED.3IONS-SCNC: 13 MMOL/L (ref 4–13)
AST SERPL W P-5'-P-CCNC: 20 U/L (ref 5–45)
BACTERIA UR QL AUTO: NORMAL /HPF
BARBITURATES UR QL: NEGATIVE
BASOPHILS # BLD AUTO: 0.09 THOUSANDS/ÂΜL (ref 0–0.1)
BASOPHILS NFR BLD AUTO: 0 % (ref 0–1)
BENZODIAZ UR QL: NEGATIVE
BILIRUB SERPL-MCNC: 0.85 MG/DL (ref 0.2–1)
BILIRUB UR QL STRIP: NEGATIVE
BUN SERPL-MCNC: 17 MG/DL (ref 5–25)
CALCIUM SERPL-MCNC: 9.2 MG/DL (ref 8.3–10.1)
CHLORIDE SERPL-SCNC: 104 MMOL/L (ref 96–108)
CLARITY UR: CLEAR
CO2 SERPL-SCNC: 25 MMOL/L (ref 21–32)
COCAINE UR QL: NEGATIVE
COLOR UR: YELLOW
CREAT SERPL-MCNC: 1.1 MG/DL (ref 0.6–1.3)
EOSINOPHIL # BLD AUTO: 0.07 THOUSAND/ÂΜL (ref 0–0.61)
EOSINOPHIL NFR BLD AUTO: 0 % (ref 0–6)
ERYTHROCYTE [DISTWIDTH] IN BLOOD BY AUTOMATED COUNT: 12.3 % (ref 11.6–15.1)
GFR SERPL CREATININE-BSD FRML MDRD: 90 ML/MIN/1.73SQ M
GLUCOSE SERPL-MCNC: 162 MG/DL (ref 65–140)
GLUCOSE UR STRIP-MCNC: NEGATIVE MG/DL
HCT VFR BLD AUTO: 43.6 % (ref 36.5–49.3)
HGB BLD-MCNC: 15 G/DL (ref 12–17)
HGB UR QL STRIP.AUTO: ABNORMAL
IMM GRANULOCYTES # BLD AUTO: 0.11 THOUSAND/UL (ref 0–0.2)
IMM GRANULOCYTES NFR BLD AUTO: 1 % (ref 0–2)
KETONES UR STRIP-MCNC: NEGATIVE MG/DL
LEUKOCYTE ESTERASE UR QL STRIP: NEGATIVE
LIPASE SERPL-CCNC: 35 U/L (ref 73–393)
LYMPHOCYTES # BLD AUTO: 0.72 THOUSANDS/ÂΜL (ref 0.6–4.47)
LYMPHOCYTES NFR BLD AUTO: 3 % (ref 14–44)
MCH RBC QN AUTO: 32.3 PG (ref 26.8–34.3)
MCHC RBC AUTO-ENTMCNC: 34.4 G/DL (ref 31.4–37.4)
MCV RBC AUTO: 94 FL (ref 82–98)
METHADONE UR QL: NEGATIVE
MONOCYTES # BLD AUTO: 1.03 THOUSAND/ÂΜL (ref 0.17–1.22)
MONOCYTES NFR BLD AUTO: 5 % (ref 4–12)
NEUTROPHILS # BLD AUTO: 19.24 THOUSANDS/ÂΜL (ref 1.85–7.62)
NEUTS SEG NFR BLD AUTO: 91 % (ref 43–75)
NITRITE UR QL STRIP: NEGATIVE
NON-SQ EPI CELLS URNS QL MICRO: NORMAL /HPF
NRBC BLD AUTO-RTO: 0 /100 WBCS
OPIATES UR QL SCN: NEGATIVE
OXYCODONE+OXYMORPHONE UR QL SCN: NEGATIVE
PCP UR QL: NEGATIVE
PH UR STRIP.AUTO: 5 [PH]
PLATELET # BLD AUTO: 308 THOUSANDS/UL (ref 149–390)
PMV BLD AUTO: 11 FL (ref 8.9–12.7)
POTASSIUM SERPL-SCNC: 3.8 MMOL/L (ref 3.5–5.3)
PROT SERPL-MCNC: 7.9 G/DL (ref 6.4–8.4)
PROT UR STRIP-MCNC: NEGATIVE MG/DL
RBC # BLD AUTO: 4.64 MILLION/UL (ref 3.88–5.62)
RBC #/AREA URNS AUTO: NORMAL /HPF
SODIUM SERPL-SCNC: 142 MMOL/L (ref 135–147)
SP GR UR STRIP.AUTO: <=1.005 (ref 1–1.03)
THC UR QL: POSITIVE
UROBILINOGEN UR QL STRIP.AUTO: 0.2 E.U./DL
WBC # BLD AUTO: 21.26 THOUSAND/UL (ref 4.31–10.16)
WBC #/AREA URNS AUTO: NORMAL /HPF

## 2023-02-06 RX ORDER — KETOROLAC TROMETHAMINE 30 MG/ML
15 INJECTION, SOLUTION INTRAMUSCULAR; INTRAVENOUS ONCE
Status: COMPLETED | OUTPATIENT
Start: 2023-02-06 | End: 2023-02-06

## 2023-02-06 RX ORDER — METOCLOPRAMIDE HYDROCHLORIDE 5 MG/ML
10 INJECTION INTRAMUSCULAR; INTRAVENOUS ONCE
Status: COMPLETED | OUTPATIENT
Start: 2023-02-06 | End: 2023-02-06

## 2023-02-06 RX ORDER — FAMOTIDINE 10 MG/ML
20 INJECTION, SOLUTION INTRAVENOUS ONCE
Status: COMPLETED | OUTPATIENT
Start: 2023-02-06 | End: 2023-02-06

## 2023-02-06 RX ORDER — MORPHINE SULFATE 4 MG/ML
4 INJECTION, SOLUTION INTRAMUSCULAR; INTRAVENOUS ONCE
Status: COMPLETED | OUTPATIENT
Start: 2023-02-06 | End: 2023-02-06

## 2023-02-06 RX ORDER — DIPHENHYDRAMINE HYDROCHLORIDE 50 MG/ML
25 INJECTION INTRAMUSCULAR; INTRAVENOUS ONCE
Status: COMPLETED | OUTPATIENT
Start: 2023-02-06 | End: 2023-02-06

## 2023-02-06 RX ORDER — ONDANSETRON 4 MG/1
4 TABLET, ORALLY DISINTEGRATING ORAL EVERY 6 HOURS PRN
Qty: 20 TABLET | Refills: 0 | Status: SHIPPED | OUTPATIENT
Start: 2023-02-06

## 2023-02-06 RX ORDER — ONDANSETRON 2 MG/ML
4 INJECTION INTRAMUSCULAR; INTRAVENOUS ONCE
Status: COMPLETED | OUTPATIENT
Start: 2023-02-06 | End: 2023-02-06

## 2023-02-06 RX ORDER — SODIUM CHLORIDE 9 MG/ML
1000 INJECTION, SOLUTION INTRAVENOUS ONCE
Status: COMPLETED | OUTPATIENT
Start: 2023-02-06 | End: 2023-02-06

## 2023-02-06 RX ADMIN — ONDANSETRON 4 MG: 2 INJECTION INTRAMUSCULAR; INTRAVENOUS at 09:38

## 2023-02-06 RX ADMIN — DIPHENHYDRAMINE HYDROCHLORIDE 25 MG: 50 INJECTION, SOLUTION INTRAMUSCULAR; INTRAVENOUS at 12:58

## 2023-02-06 RX ADMIN — METOCLOPRAMIDE 10 MG: 5 INJECTION, SOLUTION INTRAMUSCULAR; INTRAVENOUS at 12:56

## 2023-02-06 RX ADMIN — MORPHINE SULFATE 4 MG: 4 INJECTION INTRAVENOUS at 11:34

## 2023-02-06 RX ADMIN — FAMOTIDINE 20 MG: 10 INJECTION, SOLUTION INTRAVENOUS at 09:38

## 2023-02-06 RX ADMIN — SODIUM CHLORIDE 1000 ML/HR: 0.9 INJECTION, SOLUTION INTRAVENOUS at 09:44

## 2023-02-06 RX ADMIN — IOHEXOL 100 ML: 350 INJECTION, SOLUTION INTRAVENOUS at 10:31

## 2023-02-06 RX ADMIN — KETOROLAC TROMETHAMINE 15 MG: 30 INJECTION, SOLUTION INTRAMUSCULAR at 09:55

## 2023-02-06 NOTE — ED NOTES
Pt repeatedly putting finger into mouth and vomiting into trash can        Nita Bryant RN  02/06/23 9121

## 2023-02-06 NOTE — ED PROVIDER NOTES
History  Chief Complaint   Patient presents with   • Vomiting     Pt reports starting to vomit starting the middle of the night last night  Pt reports stomach pain from vomiting  Dry heaving noted in triage  Pt s/o reports pt has been continuously vomiting since it started  Patient is a 29-year-old male that presents emergency department with complaint of 1 day of vomiting, diarrhea and diffuse abdominal pain  Patient with multiple sick contacts as children in the house had similar episodes last week  He has not taken any medication for relief  Patient also states that he developed right flank pain upon entering the emergency department  He denies urinary symptoms  History provided by:  Patient   used: No    Vomiting  Severity:  Moderate  Timing:  Constant  Quality:  Bilious material  Progression:  Worsening  Chronicity:  New  Recent urination:  Normal  Relieved by:  Nothing  Worsened by:  Nothing  Ineffective treatments:  None tried  Associated symptoms: abdominal pain    Associated symptoms: no chills, no cough, no diarrhea and no fever    Abdominal pain:     Location:  Generalized    Severity:  Moderate    Onset quality:  Sudden    Timing:  Constant    Progression:  Worsening    Chronicity:  New      Prior to Admission Medications   Prescriptions Last Dose Informant Patient Reported?  Taking?   ondansetron (ZOFRAN) 4 mg tablet   No No   Sig: Take 1 tablet (4 mg total) by mouth every 6 (six) hours for 3 days   valACYclovir (VALTREX) 500 mg tablet   No No   Sig: Take 1 tablet (500 mg total) by mouth 2 (two) times a day for 3 days      Facility-Administered Medications: None       Past Medical History:   Diagnosis Date   • Anxiety    • ETOH abuse    • Genital herpes 2011       Past Surgical History:   Procedure Laterality Date   • APPENDECTOMY     • KNEE SURGERY      x 4   • POPLITEAL SYNOVIAL CYST EXCISION      knee       Family History   Problem Relation Age of Onset   • No Known Problems Mother    • Hypertension Maternal Grandmother    • Hypertension Maternal Grandfather    • Heart disease Maternal Grandfather    • Heart attack Maternal Grandfather    • Hypertension Paternal Grandmother    • Hypertension Paternal Grandfather    • No Known Problems Father      I have reviewed and agree with the history as documented  E-Cigarette/Vaping   • E-Cigarette Use Current Every Day User      E-Cigarette/Vaping Substances   • Nicotine Yes      Social History     Tobacco Use   • Smoking status: Every Day     Types: Cigarettes   • Smokeless tobacco: Never   • Tobacco comments:     3 cigarettes/day   Vaping Use   • Vaping Use: Every day   • Substances: Nicotine   Substance Use Topics   • Alcohol use: Yes     Alcohol/week: 4 0 standard drinks     Types: 4 Shots of liquor per week   • Drug use: Yes     Frequency: 21 0 times per week     Types: Marijuana     Comment: smoked last night       Review of Systems   Constitutional: Negative for chills and fever  Respiratory: Negative for cough, shortness of breath and wheezing  Cardiovascular: Negative for chest pain and palpitations  Gastrointestinal: Positive for abdominal pain and vomiting  Negative for constipation, diarrhea and nausea  Genitourinary: Negative for dysuria, flank pain, hematuria and urgency  Musculoskeletal: Negative for back pain  Skin: Negative for color change and rash  All other systems reviewed and are negative  Physical Exam  Physical Exam  Vitals and nursing note reviewed  Constitutional:       Appearance: He is well-developed  HENT:      Head: Normocephalic and atraumatic  Eyes:      Pupils: Pupils are equal, round, and reactive to light  Cardiovascular:      Rate and Rhythm: Normal rate and regular rhythm  Heart sounds: Normal heart sounds  Pulmonary:      Effort: Pulmonary effort is normal       Breath sounds: Normal breath sounds     Abdominal:      General: Bowel sounds are normal  There is no distension  Palpations: Abdomen is soft  There is no mass  Tenderness: There is no abdominal tenderness  There is no guarding or rebound  Musculoskeletal:      Cervical back: Normal range of motion and neck supple  Skin:     General: Skin is warm and dry  Neurological:      Mental Status: He is alert and oriented to person, place, and time  Psychiatric:         Mood and Affect: Mood is anxious  Behavior: Behavior is agitated  Thought Content: Thought content normal          Judgment: Judgment normal          Vital Signs  ED Triage Vitals [02/06/23 0911]   Temperature Pulse Respirations Blood Pressure SpO2   98 °F (36 7 °C) 71 20 137/60 99 %      Temp Source Heart Rate Source Patient Position - Orthostatic VS BP Location FiO2 (%)   Tympanic Monitor Lying Right arm --      Pain Score       4           Vitals:    02/06/23 0911 02/06/23 1300   BP: 137/60 135/62   Pulse: 71 68   Patient Position - Orthostatic VS: Lying Sitting         Visual Acuity      ED Medications  Medications   sodium chloride 0 9 % infusion (0 mL/hr Intravenous Stopped 2/6/23 1259)   ondansetron (ZOFRAN) injection 4 mg (4 mg Intravenous Given 2/6/23 0938)   Famotidine (PF) (PEPCID) injection 20 mg (20 mg Intravenous Given 2/6/23 0938)   ketorolac (TORADOL) injection 15 mg (15 mg Intravenous Given 2/6/23 0955)   morphine injection 4 mg (4 mg Intravenous Given 2/6/23 1134)   iohexol (OMNIPAQUE) 350 MG/ML injection (SINGLE-DOSE) 100 mL (100 mL Intravenous Given 2/6/23 1031)   metoclopramide (REGLAN) injection 10 mg (10 mg Intravenous Given 2/6/23 1256)   diphenhydrAMINE (BENADRYL) injection 25 mg (25 mg Intravenous Given 2/6/23 1258)       Diagnostic Studies  Results Reviewed     Procedure Component Value Units Date/Time    Stool Enteric Bacterial Panel by PCR [[id number]] Collected: 02/06/23 1210    Lab Status:  In process Specimen: Stool from Rectum Updated: 02/06/23 1216    Rapid drug screen, urine [[id number]] (Abnormal) Collected: 02/06/23 1117    Lab Status: Final result Specimen: Urine, Clean Catch Updated: 02/06/23 1141     Amph/Meth UR Negative     Barbiturate Ur Negative     Benzodiazepine Urine Negative     Cocaine Urine Negative     Methadone Urine Negative     Opiate Urine Negative     PCP Ur Negative     THC Urine Positive     Oxycodone Urine Negative    Narrative:      Presumptive report  If requested, specimen will be sent to reference lab for confirmation  FOR MEDICAL PURPOSES ONLY  IF CONFIRMATION NEEDED PLEASE CONTACT THE LAB WITHIN 5 DAYS      Drug Screen Cutoff Levels:  AMPHETAMINE/METHAMPHETAMINES  1000 ng/mL  BARBITURATES     200 ng/mL  BENZODIAZEPINES     200 ng/mL  COCAINE      300 ng/mL  METHADONE      300 ng/mL  OPIATES      300 ng/mL  PHENCYCLIDINE     25 ng/mL  THC       50 ng/mL  OXYCODONE      100 ng/mL    Urine Microscopic [[id number]]  (Normal) Collected: 02/06/23 1117    Lab Status: Final result Specimen: Urine, Clean Catch Updated: 02/06/23 1130     RBC, UA 2-4 /hpf      WBC, UA None Seen /hpf      Epithelial Cells None Seen /hpf      Bacteria, UA None Seen /hpf     UA w Reflex to Microscopic w Reflex to Culture [[id number]]  (Abnormal) Collected: 02/06/23 1117    Lab Status: Final result Specimen: Urine, Clean Catch Updated: 02/06/23 1125     Color, UA Yellow     Clarity, UA Clear     Specific Gravity, UA <=1 005     pH, UA 5 0     Leukocytes, UA Negative     Nitrite, UA Negative     Protein, UA Negative mg/dl      Glucose, UA Negative mg/dl      Ketones, UA Negative mg/dl      Urobilinogen, UA 0 2 E U /dl      Bilirubin, UA Negative     Occult Blood, UA Moderate    CBC and differential [[id number]]  (Abnormal) Collected: 02/06/23 0938    Lab Status: Final result Specimen: Blood from Arm, Right Updated: 02/06/23 1019     WBC 21 26 Thousand/uL      RBC 4 64 Million/uL      Hemoglobin 15 0 g/dL      Hematocrit 43 6 %      MCV 94 fL      MCH 32 3 pg      MCHC 34 4 g/dL      RDW 12 3 %      MPV 11 0 fL      Platelets 444 Thousands/uL      nRBC 0 /100 WBCs      Neutrophils Relative 91 %      Immat GRANS % 1 %      Lymphocytes Relative 3 %      Monocytes Relative 5 %      Eosinophils Relative 0 %      Basophils Relative 0 %      Neutrophils Absolute 19 24 Thousands/µL      Immature Grans Absolute 0 11 Thousand/uL      Lymphocytes Absolute 0 72 Thousands/µL      Monocytes Absolute 1 03 Thousand/µL      Eosinophils Absolute 0 07 Thousand/µL      Basophils Absolute 0 09 Thousands/µL     Narrative: This is an appended report  These results have been appended to a previously verified report      Comprehensive metabolic panel [[id number]]  (Abnormal) Collected: 02/06/23 0938    Lab Status: Final result Specimen: Blood from Arm, Right Updated: 02/06/23 1008     Sodium 142 mmol/L      Potassium 3 8 mmol/L      Chloride 104 mmol/L      CO2 25 mmol/L      ANION GAP 13 mmol/L      BUN 17 mg/dL      Creatinine 1 10 mg/dL      Glucose 162 mg/dL      Calcium 9 2 mg/dL      AST 20 U/L      ALT 19 U/L      Alkaline Phosphatase 65 U/L      Total Protein 7 9 g/dL      Albumin 4 8 g/dL      Total Bilirubin 0 85 mg/dL      eGFR 90 ml/min/1 73sq m     Narrative:      [facility] guidelines for Chronic Kidney Disease (CKD):   •  Stage 1 with normal or high GFR (GFR > 90 mL/min/1 73 square meters)  •  Stage 2 Mild CKD (GFR = 60-89 mL/min/1 73 square meters)  •  Stage 3A Moderate CKD (GFR = 45-59 mL/min/1 73 square meters)  •  Stage 3B Moderate CKD (GFR = 30-44 mL/min/1 73 square meters)  •  Stage 4 Severe CKD (GFR = 15-29 mL/min/1 73 square meters)  •  Stage 5 End Stage CKD (GFR <15 mL/min/1 73 square meters)  Note: GFR calculation is accurate only with a steady state creatinine    Lipase [[id number]]  (Abnormal) Collected: 02/06/23 0938    Lab Status: Final result Specimen: Blood from Arm, Right Updated: 02/06/23 1008     Lipase 35 u/L                  CT abdomen pelvis with contrast   Final Result by Jesse Gibson MD (02/06 1125)      No acute inflammatory process detected in the abdomen or pelvis  Workstation performed: [id number]                    Procedures  Procedures         ED Course                               SBIRT 22yo+    Flowsheet Row Most Recent Value   SBIRT (25 yo +)    In order to provide better care to our patients, we are screening all of our patients for alcohol and drug use  Would it be okay to ask you these screening questions? No Filed at: 02/06/2023 1139                    Medical Decision Making  78-year-old male presents with complaint of nausea, vomiting, diarrhea and flank/abdominal pain  Labs obtained and reviewed  Leukocytosis noted  Concern for C  difficile colitis, given diarrhea, leukocytosis  Stool studies ordered  UA reviewed  Moderate blood noted of unknown etiology  CT abdomen pelvis ordered to rule out acute infectious pathology or ureteral stone  Patient treated with Zofran, Reglan and Benadryl  Refuses to eat crackers and drink water because it is "dry"  He agrees with discharge to home at this time to follow-up with his primary care physician  I suspect that patient's persistent vomiting is worsened by his frequent marijuana use but I informed him as such  Acute right flank pain: acute illness or injury  Marijuana user: acute illness or injury  Musculoskeletal back pain: acute illness or injury  Nausea vomiting and diarrhea: acute illness or injury  Amount and/or Complexity of Data Reviewed  Labs: ordered  Radiology: ordered  Risk  Prescription drug management            Disposition  Final diagnoses:   Nausea vomiting and diarrhea   Marijuana user   Acute right flank pain   Musculoskeletal back pain     Time reflects when diagnosis was documented in both MDM as applicable and the Disposition within this note     Time User Action Codes Description Comment    2/6/2023 12:53 PM Chelsea Nuñez Add [R11 2,  R19 7] Nausea vomiting and diarrhea     2/6/2023 12:53 PM Levell Furl O Add [F12 90] Use of cannabinoid edibles     2/6/2023 12:53 PM Levell Furl Remove [F12 90] Use of cannabinoid edibles     2/6/2023 12:53 PM Levell Furl Add [F12 90] Marijuana user     2/6/2023 12:54 PM Levell Furl O Add [R10 9] Acute right flank pain     2/6/2023 12:54 PM Levell Furl Add [M54 9] Musculoskeletal back pain       ED Disposition     ED Disposition   Discharge    Condition   Stable    Date/Time   Mon Feb 6, 2023 12:53 PM    Comment   Alexander Kong discharge to home/self care  Follow-up Information    None         Discharge Medication List as of 2/6/2023 12:55 PM      START taking these medications    Details   ondansetron (ZOFRAN-ODT) 4 mg disintegrating tablet Take 1 tablet (4 mg total) by mouth every 6 (six) hours as needed for nausea or vomiting, Starting Mon 2/6/2023, Normal         CONTINUE these medications which have NOT CHANGED    Details   ondansetron (ZOFRAN) 4 mg tablet Take 1 tablet (4 mg total) by mouth every 6 (six) hours for 3 days, Starting Fri 12/31/2021, Until Mon 1/3/2022, Normal      valACYclovir (VALTREX) 500 mg tablet Take 1 tablet (500 mg total) by mouth 2 (two) times a day for 3 days, Starting Fri 1/24/2020, Until Mon 1/27/2020, Normal             No discharge procedures on file      PDMP Review     None          ED Provider  Electronically Signed by           Adriana Martinez DO  02/06/23 8313

## 2023-02-06 NOTE — ED NOTES
Patient is screaming and yelling about his back  Dr ordered pain medication and a CT scan to follow up  Pt still writhing and yelling, vitals remain stable, will continue to monitor       He Elizabeth RN  02/06/23 9594

## 2023-02-07 LAB
CAMPYLOBACTER DNA SPEC NAA+PROBE: NORMAL
SALMONELLA DNA SPEC QL NAA+PROBE: NORMAL
SHIGA TOXIN STX GENE SPEC NAA+PROBE: NORMAL
SHIGELLA DNA SPEC QL NAA+PROBE: NORMAL

## 2023-03-25 ENCOUNTER — APPOINTMENT (OUTPATIENT)
Dept: RADIOLOGY | Facility: CLINIC | Age: 28
End: 2023-03-25

## 2023-03-25 ENCOUNTER — OFFICE VISIT (OUTPATIENT)
Dept: URGENT CARE | Facility: CLINIC | Age: 28
End: 2023-03-25

## 2023-03-25 VITALS
OXYGEN SATURATION: 98 % | TEMPERATURE: 97.5 F | RESPIRATION RATE: 18 BRPM | SYSTOLIC BLOOD PRESSURE: 102 MMHG | WEIGHT: 148.6 LBS | HEIGHT: 72 IN | HEART RATE: 73 BPM | DIASTOLIC BLOOD PRESSURE: 68 MMHG | BODY MASS INDEX: 20.13 KG/M2

## 2023-03-25 DIAGNOSIS — S99.922A TOE INJURY, LEFT, INITIAL ENCOUNTER: ICD-10-CM

## 2023-03-25 DIAGNOSIS — S92.492A OTHER FRACTURE OF LEFT GREAT TOE, INITIAL ENCOUNTER FOR CLOSED FRACTURE: Primary | ICD-10-CM

## 2023-03-25 NOTE — PROGRESS NOTES
[facility] [facility]        NAME: Olivia Barone is a 32 y o  male  : 1995    MRN: [id number]  DATE: 2023  TIME: 11:39 AM    Assessment and Plan   Other fracture of left great toe, initial encounter for closed fracture [S92 492A]  1  Other fracture of left great toe, initial encounter for closed fracture  XR toe left great min 2 views    Ambulatory Referral to [facility]        XR with avulsion fracture of distal phalanx and another non displaced fracture of proximal phalanx  Recommend naproxen, ice, rest, elevation  Light duty until cleared by ortho/podiatry  Discussed strict return to care precautions as well as red flag symptoms which should prompt immediate ED referral  Pt verbalized understanding and is in agreement with plan  Please follow up with your primary care provider within the next week  Please remember that your visit today was with an urgent care provider and should not replace follow up with your primary care provider for chronic medical issues or annual physicals  Patient Instructions       Follow up with PCP in 3-5 days  Proceed to  ER if symptoms worsen  Chief Complaint     Chief Complaint   Patient presents with   • Toe Injury     Pt here for  left big toe injury pt states  he stubbed it on the couch  yesterday at 5 PM   Pain 7 /10   pt used Naproxen  last night  History of Present Illness       Patient is a 68-year-old male with past medical history of anxiety, EtOH abuse presenting with left great toe pain x1 day  States he stubbed it on the couch yesterday and has been having pain ever since  Rates pain as 7 out of 10 in severity  Took naproxen which did provide some relief  No numbness or tingling  Review of Systems   Review of Systems   Constitutional: Negative for chills and fever  Respiratory: Negative for shortness of breath  Cardiovascular: Negative for chest pain  Gastrointestinal: Negative for nausea and vomiting  Musculoskeletal: Negative for arthralgias, back pain, joint swelling, myalgias and neck pain  Skin: Negative for color change and wound  Neurological: Negative for weakness and numbness  Current Medications       Current Outpatient Medications:   •  valACYclovir (VALTREX) 500 mg tablet, Take 1 tablet (500 mg total) by mouth 2 (two) times a day for 3 days, Disp: 6 tablet, Rfl: 0    Current Allergies     Allergies as of 03/25/2023   • (No Known Allergies)            The following portions of the patient's history were reviewed and updated as appropriate: allergies, current medications, past family history, past medical history, past social history, past surgical history and problem list      Past Medical History:   Diagnosis Date   • Anxiety    • ETOH abuse    • Genital herpes 2011       Past Surgical History:   Procedure Laterality Date   • APPENDECTOMY     • CYST REMOVAL      right wrist   • KNEE SURGERY      x 4   • POPLITEAL SYNOVIAL CYST EXCISION      knee       Family History   Problem Relation Age of Onset   • No Known Problems Mother    • No Known Problems Father    • Hypertension Maternal Grandmother    • Hypertension Maternal Grandfather    • Heart disease Maternal Grandfather    • Heart attack Maternal Grandfather    • Hypertension Paternal Grandmother    • Hypertension Paternal Grandfather          Medications have been verified  Objective   /68   Pulse 73   Temp 97 5 °F (36 4 °C) (Tympanic)   Resp 18   Ht 6' (1 829 m)   Wt 67 4 kg (148 lb 9 6 oz)   SpO2 98%   BMI 20 15 kg/m²        Physical Exam     Physical Exam  Vitals and nursing note reviewed  Constitutional:       General: He is not in acute distress  Appearance: Normal appearance  He is not ill-appearing  HENT:      Head: Normocephalic and atraumatic  Cardiovascular:      Rate and Rhythm: Normal rate  Pulmonary:      Effort: Pulmonary effort is normal  No respiratory distress     Musculoskeletal: Right foot: Normal       Left foot: Decreased range of motion  Normal capillary refill  Bony tenderness (great toe) present  No swelling or deformity  Normal pulse  Comments: Ambulating with a crutch  Bruising noted on L great toe near IP   Skin:     General: Skin is warm and dry  Capillary Refill: Capillary refill takes less than 2 seconds  Neurological:      Mental Status: He is alert and oriented to person, place, and time     Psychiatric:         Behavior: Behavior normal

## 2023-03-25 NOTE — LETTER
March 25, 2023     Patient: Luca Laguna III   YOB: 1995   Date of Visit: 3/25/2023       To Whom It May Concern: It is my medical opinion that Luca Laguna may return to light duty immediately with the following restrictions: should not stand for more than 30 minutes at a time  Needs to rest and keep left foot elevated as much as possible  If you have any questions or concerns, please don't hesitate to call           Sincerely,        Marybel Antoine PA-C    CC: No Recipients

## 2023-03-30 ENCOUNTER — OFFICE VISIT (OUTPATIENT)
Dept: PODIATRY | Facility: CLINIC | Age: 28
End: 2023-03-30

## 2023-03-30 VITALS — WEIGHT: 148 LBS | HEIGHT: 72 IN | BODY MASS INDEX: 20.05 KG/M2

## 2023-03-30 DIAGNOSIS — M79.672 LEFT FOOT PAIN: ICD-10-CM

## 2023-03-30 DIAGNOSIS — S92.415A CLOSED NONDISPLACED FRACTURE OF PROXIMAL PHALANX OF LEFT GREAT TOE, INITIAL ENCOUNTER: Primary | ICD-10-CM

## 2023-03-30 RX ORDER — MELOXICAM 7.5 MG/1
7.5 TABLET ORAL DAILY
Qty: 20 TABLET | Refills: 0 | Status: SHIPPED | OUTPATIENT
Start: 2023-03-30 | End: 2023-04-19

## 2023-03-30 NOTE — PROGRESS NOTES
Assessment/Plan: Fracture proximal phalanx left hallux  History of injury  Rule out tuft fracture distal phalanx left hallux  Pain  Plan  Chart reviewed  X-rays reviewed with patient  Patient advised on condition  At this time patient will stay in surgical shoe for approximately 3 weeks  He will remain out of work  We will add Mobic  Patient advised on aftercare  Diagnoses and all orders for this visit:    Closed nondisplaced fracture of proximal phalanx of left great toe, initial encounter  -     meloxicam (MOBIC) 7 5 mg tablet; Take 1 tablet (7 5 mg total) by mouth daily for 20 days    Left foot pain  -     meloxicam (MOBIC) 7 5 mg tablet; Take 1 tablet (7 5 mg total) by mouth daily for 20 days          Subjective: Patient is seen in referral from urgent care  Patient has history of injury approximately 1 week prior where he injured his foot at home  He went to urgent care  Diagnosed with a toe fracture  Given a surgical shoe  He presents for observation and evaluation  No Known Allergies      Current Outpatient Medications:   •  meloxicam (MOBIC) 7 5 mg tablet, Take 1 tablet (7 5 mg total) by mouth daily for 20 days, Disp: 20 tablet, Rfl: 0  •  valACYclovir (VALTREX) 500 mg tablet, Take 1 tablet (500 mg total) by mouth 2 (two) times a day for 3 days, Disp: 6 tablet, Rfl: 0    Patient Active Problem List   Diagnosis   • Infectious gastroenteritis   • SIRS (systemic inflammatory response syndrome) (HCC)   • Marijuana dependence (HCC)   • Acute pharyngitis   • Anxiety   • Other fracture of left great toe, initial encounter for closed fracture          Patient ID: Marie Boyd is a 32 y o  male      HPI    The following portions of the patient's history were reviewed and updated as appropriate:     family history includes Heart attack in his maternal grandfather; Heart disease in his maternal grandfather; Hypertension in his maternal grandfather, maternal grandmother, paternal grandfather, and paternal grandmother; No Known Problems in his father and mother  reports that he has quit smoking  His smoking use included cigarettes  He has never used smokeless tobacco  He reports current alcohol use of about 4 0 standard drinks per week  He reports that he does not currently use drugs after having used the following drugs: Marijuana  Frequency: 21 00 times per week  There were no vitals filed for this visit  Review of Systems      Objective:  Patient's shoes and socks removed  Foot Exam    General  General Appearance: appears stated age and healthy   Orientation: alert and oriented to person, place, and time   Affect: appropriate   Gait: antalgic       Right Foot/Ankle     Inspection and Palpation  Swelling: none   Arch: pes planus    Neurovascular  Dorsalis pedis: 3+  Posterior tibial: 3+      Left Foot/Ankle      Inspection and Palpation  Ecchymosis: dorsum and first toe  Tenderness: bony tenderness   Swelling: dorsum   Arch: pes planus    Neurovascular  Dorsalis pedis: 3+  Posterior tibial: 3+        Physical Exam  Vitals and nursing note reviewed  Constitutional:       Appearance: Normal appearance  Cardiovascular:      Rate and Rhythm: Normal rate and regular rhythm  Pulses:           Dorsalis pedis pulses are 3+ on the right side and 3+ on the left side  Posterior tibial pulses are 3+ on the right side and 3+ on the left side  Musculoskeletal:      Left foot: Bony tenderness present  Feet:      Comments: Left foot demonstrates ecchymosis on the dorsum of the left hallux as well as the MPJ  There is edema  No evidence of cellulitis  Pain with palpation IPJ left hallux  Patient has full range of motion  X-ray demonstrates fracture proximal phalanx medial aspect  There also appears to be distal tuft fracture  Skin:     Capillary Refill: Capillary refill takes less than 2 seconds  Neurological:      Mental Status: He is alert     Psychiatric: Mood and Affect: Mood normal          Behavior: Behavior normal          Thought Content:  Thought content normal          Judgment: Judgment normal

## 2023-04-20 NOTE — ED NOTES
Per pharmacy cetirizine not available     Pt is sleeping at this time   No signs or symptoms of distress     Winifred Barnes RN  12/31/21 [id number]

## 2024-02-04 ENCOUNTER — HOSPITAL ENCOUNTER (EMERGENCY)
Facility: HOSPITAL | Age: 29
Discharge: HOME | End: 2024-02-04
Attending: EMERGENCY MEDICINE
Payer: COMMERCIAL

## 2024-02-04 ENCOUNTER — APPOINTMENT (EMERGENCY)
Dept: RADIOLOGY | Facility: HOSPITAL | Age: 29
End: 2024-02-04
Payer: COMMERCIAL

## 2024-02-04 VITALS
OXYGEN SATURATION: 99 % | RESPIRATION RATE: 22 BRPM | SYSTOLIC BLOOD PRESSURE: 118 MMHG | DIASTOLIC BLOOD PRESSURE: 88 MMHG | TEMPERATURE: 97.5 F | HEART RATE: 60 BPM

## 2024-02-04 DIAGNOSIS — K52.9 GASTROENTERITIS: Primary | ICD-10-CM

## 2024-02-04 DIAGNOSIS — R11.2 NAUSEA VOMITING AND DIARRHEA: ICD-10-CM

## 2024-02-04 DIAGNOSIS — R19.7 NAUSEA VOMITING AND DIARRHEA: ICD-10-CM

## 2024-02-04 LAB
ALBUMIN SERPL BCP-MCNC: 5 G/DL (ref 3.5–5)
ALP SERPL-CCNC: 56 U/L (ref 34–104)
ALT SERPL W P-5'-P-CCNC: 16 U/L (ref 7–52)
ANION GAP SERPL CALCULATED.3IONS-SCNC: 11 MMOL/L
AST SERPL W P-5'-P-CCNC: 19 U/L (ref 13–39)
BASOPHILS # BLD AUTO: 0.1 THOUSANDS/ÂΜL (ref 0–0.1)
BASOPHILS NFR BLD AUTO: 0 % (ref 0–1)
BILIRUB SERPL-MCNC: 0.65 MG/DL (ref 0.2–1)
BUN SERPL-MCNC: 20 MG/DL (ref 5–25)
CALCIUM SERPL-MCNC: 10 MG/DL (ref 8.4–10.2)
CHLORIDE SERPL-SCNC: 104 MMOL/L (ref 96–108)
CO2 SERPL-SCNC: 24 MMOL/L (ref 21–32)
CREAT SERPL-MCNC: 1.05 MG/DL (ref 0.6–1.3)
EOSINOPHIL # BLD AUTO: 0.09 THOUSAND/ÂΜL (ref 0–0.61)
EOSINOPHIL NFR BLD AUTO: 0 % (ref 0–6)
ERYTHROCYTE [DISTWIDTH] IN BLOOD BY AUTOMATED COUNT: 12.5 % (ref 11.6–15.1)
GFR SERPL CREATININE-BSD FRML MDRD: 94 ML/MIN/1.73SQ M
GLUCOSE SERPL-MCNC: 132 MG/DL (ref 65–140)
HCT VFR BLD AUTO: 45.6 % (ref 36.5–49.3)
HGB BLD-MCNC: 16.2 G/DL (ref 12–17)
IMM GRANULOCYTES # BLD AUTO: 0.14 THOUSAND/UL (ref 0–0.2)
IMM GRANULOCYTES NFR BLD AUTO: 1 % (ref 0–2)
LIPASE SERPL-CCNC: 9 U/L (ref 11–82)
LYMPHOCYTES # BLD AUTO: 1.31 THOUSANDS/ÂΜL (ref 0.6–4.47)
LYMPHOCYTES NFR BLD AUTO: 5 % (ref 14–44)
MAGNESIUM SERPL-MCNC: 1.9 MG/DL (ref 1.9–2.7)
MCH RBC QN AUTO: 32.5 PG (ref 26.8–34.3)
MCHC RBC AUTO-ENTMCNC: 35.5 G/DL (ref 31.4–37.4)
MCV RBC AUTO: 92 FL (ref 82–98)
MONOCYTES # BLD AUTO: 1.54 THOUSAND/ÂΜL (ref 0.17–1.22)
MONOCYTES NFR BLD AUTO: 5 % (ref 4–12)
NEUTROPHILS # BLD AUTO: 26.23 THOUSANDS/ÂΜL (ref 1.85–7.62)
NEUTS SEG NFR BLD AUTO: 89 % (ref 43–75)
NRBC BLD AUTO-RTO: 0 /100 WBCS
PLATELET # BLD AUTO: 356 THOUSANDS/UL (ref 149–390)
PMV BLD AUTO: 10.3 FL (ref 8.9–12.7)
POTASSIUM SERPL-SCNC: 4 MMOL/L (ref 3.5–5.3)
PROT SERPL-MCNC: 7.3 G/DL (ref 6.4–8.4)
RBC # BLD AUTO: 4.98 MILLION/UL (ref 3.88–5.62)
SODIUM SERPL-SCNC: 139 MMOL/L (ref 135–147)
WBC # BLD AUTO: 29.41 THOUSAND/UL (ref 4.31–10.16)

## 2024-02-04 PROCEDURE — 80053 COMPREHEN METABOLIC PANEL: CPT | Performed by: EMERGENCY MEDICINE

## 2024-02-04 PROCEDURE — 83735 ASSAY OF MAGNESIUM: CPT | Performed by: EMERGENCY MEDICINE

## 2024-02-04 PROCEDURE — 96375 TX/PRO/DX INJ NEW DRUG ADDON: CPT

## 2024-02-04 PROCEDURE — 99284 EMERGENCY DEPT VISIT MOD MDM: CPT

## 2024-02-04 PROCEDURE — 96376 TX/PRO/DX INJ SAME DRUG ADON: CPT

## 2024-02-04 PROCEDURE — 74177 CT ABD & PELVIS W/CONTRAST: CPT

## 2024-02-04 PROCEDURE — 99285 EMERGENCY DEPT VISIT HI MDM: CPT | Performed by: EMERGENCY MEDICINE

## 2024-02-04 PROCEDURE — 85025 COMPLETE CBC W/AUTO DIFF WBC: CPT | Performed by: EMERGENCY MEDICINE

## 2024-02-04 PROCEDURE — 83690 ASSAY OF LIPASE: CPT | Performed by: EMERGENCY MEDICINE

## 2024-02-04 PROCEDURE — 96365 THER/PROPH/DIAG IV INF INIT: CPT

## 2024-02-04 PROCEDURE — 36415 COLL VENOUS BLD VENIPUNCTURE: CPT | Performed by: EMERGENCY MEDICINE

## 2024-02-04 RX ORDER — ONDANSETRON 2 MG/ML
4 INJECTION INTRAMUSCULAR; INTRAVENOUS ONCE
Status: COMPLETED | OUTPATIENT
Start: 2024-02-04 | End: 2024-02-04

## 2024-02-04 RX ORDER — ONDANSETRON 4 MG/1
4 TABLET, ORALLY DISINTEGRATING ORAL EVERY 6 HOURS PRN
Qty: 20 TABLET | Refills: 0 | Status: SHIPPED | OUTPATIENT
Start: 2024-02-04

## 2024-02-04 RX ORDER — DROPERIDOL 2.5 MG/ML
0.62 INJECTION, SOLUTION INTRAMUSCULAR; INTRAVENOUS ONCE
Status: COMPLETED | OUTPATIENT
Start: 2024-02-04 | End: 2024-02-04

## 2024-02-04 RX ADMIN — DROPERIDOL 0.62 MG: 2.5 INJECTION, SOLUTION INTRAMUSCULAR; INTRAVENOUS at 06:11

## 2024-02-04 RX ADMIN — ONDANSETRON 4 MG: 2 INJECTION INTRAMUSCULAR; INTRAVENOUS at 05:12

## 2024-02-04 RX ADMIN — IOHEXOL 100 ML: 350 INJECTION, SOLUTION INTRAVENOUS at 06:56

## 2024-02-04 RX ADMIN — ONDANSETRON 4 MG: 2 INJECTION INTRAMUSCULAR; INTRAVENOUS at 04:41

## 2024-02-04 RX ADMIN — SODIUM CHLORIDE, SODIUM LACTATE, POTASSIUM CHLORIDE, AND CALCIUM CHLORIDE 1000 ML: .6; .31; .03; .02 INJECTION, SOLUTION INTRAVENOUS at 04:42

## 2024-02-04 NOTE — ED NOTES
"Patient repeatedly sticking fingers down throat to induce vomiting stating \"it is the only time I feel better.\" Patient advised by hallie RN's and Dr Valero to not perform this maneuver. Patient not listening to advice.     Vernon Grove RN  02/04/24 9638    "

## 2024-02-04 NOTE — Clinical Note
Fercho Oakes was seen and treated in our emergency department on 2/4/2024.                Diagnosis:     Fercho  may return to work on return date.    He may return on this date: 02/06/2024         If you have any questions or concerns, please don't hesitate to call.      Kari Valero, DO    ______________________________           _______________          _______________  Hospital Representative                              Date                                Time

## 2024-02-04 NOTE — ED PROVIDER NOTES
History  Chief Complaint   Patient presents with    Vomiting     Patient arrives c/o vomiting since midnight. Pt states he woke up vomiting and cannot stop; pt noted to be sticking fingers down throat to induce vomiting during triage.      Patient is a 28-year-old male with a history of alcohol abuse, anxiety presents emergency department with complaint of sudden onset of vomiting that began at 12 midnight.  Patient also with 2 episodes of nonbloody diarrhea.  Patient is retching violently at the time of initial evaluation, shoving his fingers down his throat to induce more vomiting.      History provided by:  Patient  History limited by:  Psychiatric disorder   used: No    Vomiting  Associated symptoms: diarrhea    Associated symptoms: no abdominal pain, no chills, no cough and no fever        Prior to Admission Medications   Prescriptions Last Dose Informant Patient Reported? Taking?   meloxicam (MOBIC) 7.5 mg tablet   No No   Sig: Take 1 tablet (7.5 mg total) by mouth daily for 20 days   valACYclovir (VALTREX) 500 mg tablet   No No   Sig: Take 1 tablet (500 mg total) by mouth 2 (two) times a day for 3 days      Facility-Administered Medications: None       Past Medical History:   Diagnosis Date    Anxiety     ETOH abuse     Genital herpes 2011       Past Surgical History:   Procedure Laterality Date    APPENDECTOMY      CYST REMOVAL      right wrist    KNEE SURGERY      x 4    POPLITEAL SYNOVIAL CYST EXCISION      knee       Family History   Problem Relation Age of Onset    No Known Problems Mother     No Known Problems Father     Hypertension Maternal Grandmother     Hypertension Maternal Grandfather     Heart disease Maternal Grandfather     Heart attack Maternal Grandfather     Hypertension Paternal Grandmother     Hypertension Paternal Grandfather      I have reviewed and agree with the history as documented.    E-Cigarette/Vaping    E-Cigarette Use Current Every Day User       E-Cigarette/Vaping Substances    Nicotine Yes     THC Yes      Social History     Tobacco Use    Smoking status: Former     Types: Cigarettes    Smokeless tobacco: Never    Tobacco comments:     3 cigarettes/day   Vaping Use    Vaping status: Every Day    Substances: Nicotine, THC   Substance Use Topics    Alcohol use: Yes     Alcohol/week: 4.0 standard drinks of alcohol     Types: 4 Shots of liquor per week    Drug use: Yes     Frequency: 21.0 times per week     Types: Marijuana     Comment: smoked last night       Review of Systems   Constitutional:  Negative for chills and fever.   Respiratory:  Negative for cough, shortness of breath and wheezing.    Cardiovascular:  Negative for chest pain and palpitations.   Gastrointestinal:  Positive for diarrhea, nausea and vomiting. Negative for abdominal pain and constipation.   Genitourinary:  Negative for dysuria, flank pain, hematuria and urgency.   Musculoskeletal:  Negative for back pain.   Skin:  Negative for color change and rash.   All other systems reviewed and are negative.      Physical Exam  Physical Exam  Vitals and nursing note reviewed.   Constitutional:       Appearance: He is well-developed.   HENT:      Head: Normocephalic and atraumatic.   Eyes:      Pupils: Pupils are equal, round, and reactive to light.   Cardiovascular:      Rate and Rhythm: Normal rate and regular rhythm.      Heart sounds: Normal heart sounds.   Pulmonary:      Effort: Pulmonary effort is normal.      Breath sounds: Normal breath sounds.   Abdominal:      General: Bowel sounds are normal. There is no distension.      Palpations: Abdomen is soft. There is no mass.      Tenderness: There is abdominal tenderness. There is no guarding or rebound.   Musculoskeletal:      Cervical back: Normal range of motion and neck supple.   Skin:     General: Skin is warm and dry.      Capillary Refill: Capillary refill takes less than 2 seconds.   Neurological:      General: No focal deficit  present.      Mental Status: He is alert and oriented to person, place, and time.   Psychiatric:         Mood and Affect: Mood is anxious.         Behavior: Behavior is hyperactive.         Thought Content: Thought content normal.         Judgment: Judgment normal.         Vital Signs  ED Triage Vitals [02/04/24 0435]   Temperature Pulse Respirations Blood Pressure SpO2   (!) 97.4 °F (36.3 °C) (!) 54 20 125/91 99 %      Temp Source Heart Rate Source Patient Position - Orthostatic VS BP Location FiO2 (%)   Axillary Monitor -- -- --      Pain Score       --           Vitals:    02/04/24 0435   BP: 125/91   Pulse: (!) 54         Visual Acuity      ED Medications  Medications   ondansetron (ZOFRAN) injection 4 mg (4 mg Intravenous Given 2/4/24 0441)   lactated ringers bolus 1,000 mL (0 mL Intravenous Stopped 2/4/24 0605)   ondansetron (ZOFRAN) injection 4 mg (4 mg Intravenous Given 2/4/24 0512)   droperidol (INAPSINE) injection 0.625 mg (0.625 mg Intravenous Given 2/4/24 0611)   iohexol (OMNIPAQUE) 350 MG/ML injection (MULTI-DOSE) 100 mL (100 mL Intravenous Given 2/4/24 0656)       Diagnostic Studies  Results Reviewed       Procedure Component Value Units Date/Time    Lipase [[id number]]  (Abnormal) Collected: 02/04/24 0442    Lab Status: Final result Specimen: Blood from Arm, Right Updated: 02/04/24 0511     Lipase 9 u/L     Comprehensive metabolic panel [[id number]] Collected: 02/04/24 0442    Lab Status: Final result Specimen: Blood from Arm, Right Updated: 02/04/24 0511     Sodium 139 mmol/L      Potassium 4.0 mmol/L      Chloride 104 mmol/L      CO2 24 mmol/L      ANION GAP 11 mmol/L      BUN 20 mg/dL      Creatinine 1.05 mg/dL      Glucose 132 mg/dL      Calcium 10.0 mg/dL      AST 19 U/L      ALT 16 U/L      Alkaline Phosphatase 56 U/L      Total Protein 7.3 g/dL      Albumin 5.0 g/dL      Total Bilirubin 0.65 mg/dL      eGFR 94 ml/min/1.73sq m     Narrative:      [employer] guidelines for  Chronic Kidney Disease (CKD):     Stage 1 with normal or high GFR (GFR > 90 mL/min/1.73 square meters)    Stage 2 Mild CKD (GFR = 60-89 mL/min/1.73 square meters)    Stage 3A Moderate CKD (GFR = 45-59 mL/min/1.73 square meters)    Stage 3B Moderate CKD (GFR = 30-44 mL/min/1.73 square meters)    Stage 4 Severe CKD (GFR = 15-29 mL/min/1.73 square meters)    Stage 5 End Stage CKD (GFR <15 mL/min/1.73 square meters)  Note: GFR calculation is accurate only with a steady state creatinine    Magnesium [[id number]]  (Normal) Collected: 02/04/24 0442    Lab Status: Final result Specimen: Blood from Arm, Right Updated: 02/04/24 0511     Magnesium 1.9 mg/dL     CBC and differential [[id number]]  (Abnormal) Collected: 02/04/24 0442    Lab Status: Final result Specimen: Blood from Arm, Right Updated: 02/04/24 0448     WBC 29.41 Thousand/uL      RBC 4.98 Million/uL      Hemoglobin 16.2 g/dL      Hematocrit 45.6 %      MCV 92 fL      MCH 32.5 pg      MCHC 35.5 g/dL      RDW 12.5 %      MPV 10.3 fL      Platelets 356 Thousands/uL      nRBC 0 /100 WBCs      Neutrophils Relative 89 %      Immat GRANS % 1 %      Lymphocytes Relative 5 %      Monocytes Relative 5 %      Eosinophils Relative 0 %      Basophils Relative 0 %      Neutrophils Absolute 26.23 Thousands/µL      Immature Grans Absolute 0.14 Thousand/uL      Lymphocytes Absolute 1.31 Thousands/µL      Monocytes Absolute 1.54 Thousand/µL      Eosinophils Absolute 0.09 Thousand/µL      Basophils Absolute 0.10 Thousands/µL     Rapid drug screen, urine [[id number]]     Lab Status: No result Specimen: Urine     UA (URINE) with reflex to Scope [[id number]]     Lab Status: No result Specimen: Urine                    CT abdomen pelvis with contrast   Final Result by Cristina Uribe MD (02/04 0732)      Scattered fluid-filled loops of small bowel, sigmoid colon, and rectum, likely diarrheal in etiology.         Workstation performed: [id number]                     Procedures  Procedures         ED Course  ED Course as of 02/04/24 0743   Sun Feb 04, 2024   0553 Patient jumped off the CT table and is refusing CAT scan.  CT tech states that patient is not laying on the floor, with his hand down his throat forcing himself to vomit.                                             Medical Decision Making  28-year-old male in the ED with complaint of nausea, vomiting and diarrhea.  Differential diagnosis includes but is not limited to acute colitis, gastroenteritis, diverticulitis.  Labs ordered and reviewed.  WBC - 29, concerning for an acute intraabdominal infection.  Patient medicated with Zofran, IVF and droperidol.  CT ordered and reviewed, consistent with gastroenteritis.  Patient be discharged to home with a prescription for Zofran.  Patient is not actively vomiting at the time of dispo.    Amount and/or Complexity of Data Reviewed  Labs: ordered.  Radiology: ordered.    Risk  Prescription drug management.             Disposition  Final diagnoses:   Gastroenteritis   Nausea vomiting and diarrhea     Time reflects when diagnosis was documented in both MDM as applicable and the Disposition within this note       Time User Action Codes Description Comment    2/4/2024  7:39 AM Kari Valero Add [K52.9] Gastroenteritis     2/4/2024  7:39 AM Kari Valero Add [R11.2,  R19.7] Nausea vomiting and diarrhea           ED Disposition       ED Disposition   Discharge    Condition   Stable    Date/Time   Sun Feb 4, 2024  7:39 AM    Comment   Fercho Oakes III discharge to home/self care.                   Follow-up Information    None         Patient's Medications   Discharge Prescriptions    ONDANSETRON (ZOFRAN ODT) 4 MG DISINTEGRATING TABLET    Take 1 tablet (4 mg total) by mouth every 6 (six) hours as needed for nausea or vomiting       Start Date: 2/4/2024  End Date: --       Order Dose: 4 mg       Quantity: 20 tablet    Refills: 0       No discharge procedures on  file.    PDMP Review       None            ED Provider  Electronically Signed by             Kari Valero DO  02/04/24 0744

## 2024-02-21 PROBLEM — A09 INFECTIOUS GASTROENTERITIS: Status: RESOLVED | Noted: 2018-08-14 | Resolved: 2024-02-21

## 2024-02-21 PROBLEM — J02.9 ACUTE PHARYNGITIS: Status: RESOLVED | Noted: 2019-03-19 | Resolved: 2024-02-21

## 2025-07-31 ENCOUNTER — HOSPITAL ENCOUNTER (EMERGENCY)
Facility: HOSPITAL | Age: 30
Discharge: HOME | End: 2025-07-31
Attending: EMERGENCY MEDICINE | Admitting: EMERGENCY MEDICINE
Payer: COMMERCIAL

## 2025-07-31 VITALS
HEART RATE: 69 BPM | RESPIRATION RATE: 18 BRPM | SYSTOLIC BLOOD PRESSURE: 135 MMHG | TEMPERATURE: 98.6 F | DIASTOLIC BLOOD PRESSURE: 82 MMHG | OXYGEN SATURATION: 99 %

## 2025-07-31 DIAGNOSIS — R11.15 CYCLICAL VOMITING: Primary | ICD-10-CM

## 2025-07-31 LAB
ALBUMIN SERPL BCG-MCNC: 5.2 G/DL (ref 3.5–5)
ALP SERPL-CCNC: 46 U/L (ref 34–104)
ALT SERPL W P-5'-P-CCNC: 15 U/L (ref 7–52)
ANION GAP SERPL CALCULATED.3IONS-SCNC: 12 MMOL/L (ref 4–13)
AST SERPL W P-5'-P-CCNC: 20 U/L (ref 13–39)
BASOPHILS # BLD AUTO: 0.07 THOUSANDS/ÂΜL (ref 0–0.1)
BASOPHILS NFR BLD AUTO: 1 % (ref 0–1)
BILIRUB SERPL-MCNC: 0.8 MG/DL (ref 0.2–1)
BUN SERPL-MCNC: 18 MG/DL (ref 5–25)
CALCIUM SERPL-MCNC: 9.9 MG/DL (ref 8.4–10.2)
CHLORIDE SERPL-SCNC: 106 MMOL/L (ref 96–108)
CO2 SERPL-SCNC: 23 MMOL/L (ref 21–32)
CREAT SERPL-MCNC: 1.09 MG/DL (ref 0.6–1.3)
EOSINOPHIL # BLD AUTO: 0.01 THOUSAND/ÂΜL (ref 0–0.61)
EOSINOPHIL NFR BLD AUTO: 0 % (ref 0–6)
ERYTHROCYTE [DISTWIDTH] IN BLOOD BY AUTOMATED COUNT: 12.1 % (ref 11.6–15.1)
GFR SERPL CREATININE-BSD FRML MDRD: 89 ML/MIN/1.73SQ M
GLUCOSE SERPL-MCNC: 140 MG/DL (ref 65–140)
HCT VFR BLD AUTO: 40.9 % (ref 36.5–49.3)
HGB BLD-MCNC: 14 G/DL (ref 12–17)
IMM GRANULOCYTES # BLD AUTO: 0.08 THOUSAND/UL (ref 0–0.2)
IMM GRANULOCYTES NFR BLD AUTO: 1 % (ref 0–2)
LIPASE SERPL-CCNC: 9 U/L (ref 11–82)
LYMPHOCYTES # BLD AUTO: 1.03 THOUSANDS/ÂΜL (ref 0.6–4.47)
LYMPHOCYTES NFR BLD AUTO: 7 % (ref 14–44)
MCH RBC QN AUTO: 32 PG (ref 26.8–34.3)
MCHC RBC AUTO-ENTMCNC: 34.2 G/DL (ref 31.4–37.4)
MCV RBC AUTO: 93 FL (ref 82–98)
MONOCYTES # BLD AUTO: 0.42 THOUSAND/ÂΜL (ref 0.17–1.22)
MONOCYTES NFR BLD AUTO: 3 % (ref 4–12)
NEUTROPHILS # BLD AUTO: 13.5 THOUSANDS/ÂΜL (ref 1.85–7.62)
NEUTS SEG NFR BLD AUTO: 88 % (ref 43–75)
NRBC BLD AUTO-RTO: 0 /100 WBCS
PLATELET # BLD AUTO: 298 THOUSANDS/UL (ref 149–390)
PMV BLD AUTO: 10.5 FL (ref 8.9–12.7)
POTASSIUM SERPL-SCNC: 4.2 MMOL/L (ref 3.5–5.3)
PROT SERPL-MCNC: 7.8 G/DL (ref 6.4–8.4)
RBC # BLD AUTO: 4.38 MILLION/UL (ref 3.88–5.62)
SODIUM SERPL-SCNC: 141 MMOL/L (ref 135–147)
WBC # BLD AUTO: 15.11 THOUSAND/UL (ref 4.31–10.16)

## 2025-07-31 PROCEDURE — 96375 TX/PRO/DX INJ NEW DRUG ADDON: CPT

## 2025-07-31 PROCEDURE — 36415 COLL VENOUS BLD VENIPUNCTURE: CPT | Performed by: EMERGENCY MEDICINE

## 2025-07-31 PROCEDURE — 96361 HYDRATE IV INFUSION ADD-ON: CPT

## 2025-07-31 PROCEDURE — 93005 ELECTROCARDIOGRAM TRACING: CPT

## 2025-07-31 PROCEDURE — 85025 COMPLETE CBC W/AUTO DIFF WBC: CPT | Performed by: EMERGENCY MEDICINE

## 2025-07-31 PROCEDURE — 96374 THER/PROPH/DIAG INJ IV PUSH: CPT

## 2025-07-31 PROCEDURE — 99283 EMERGENCY DEPT VISIT LOW MDM: CPT

## 2025-07-31 PROCEDURE — 99284 EMERGENCY DEPT VISIT MOD MDM: CPT | Performed by: EMERGENCY MEDICINE

## 2025-07-31 PROCEDURE — 80053 COMPREHEN METABOLIC PANEL: CPT | Performed by: EMERGENCY MEDICINE

## 2025-07-31 PROCEDURE — 83690 ASSAY OF LIPASE: CPT | Performed by: EMERGENCY MEDICINE

## 2025-07-31 RX ORDER — DROPERIDOL 2.5 MG/ML
0.62 INJECTION, SOLUTION INTRAMUSCULAR; INTRAVENOUS ONCE
Status: COMPLETED | OUTPATIENT
Start: 2025-07-31 | End: 2025-07-31

## 2025-07-31 RX ORDER — DIPHENHYDRAMINE HYDROCHLORIDE 50 MG/ML
25 INJECTION, SOLUTION INTRAMUSCULAR; INTRAVENOUS ONCE
Status: COMPLETED | OUTPATIENT
Start: 2025-07-31 | End: 2025-07-31

## 2025-07-31 RX ORDER — ONDANSETRON 2 MG/ML
4 INJECTION INTRAMUSCULAR; INTRAVENOUS ONCE
Status: COMPLETED | OUTPATIENT
Start: 2025-07-31 | End: 2025-07-31

## 2025-07-31 RX ORDER — FAMOTIDINE 20 MG/1
20 TABLET, FILM COATED ORAL 2 TIMES DAILY
Qty: 30 TABLET | Refills: 0 | Status: SHIPPED | OUTPATIENT
Start: 2025-07-31

## 2025-07-31 RX ORDER — METOCLOPRAMIDE HYDROCHLORIDE 5 MG/ML
10 INJECTION INTRAMUSCULAR; INTRAVENOUS ONCE
Status: COMPLETED | OUTPATIENT
Start: 2025-07-31 | End: 2025-07-31

## 2025-07-31 RX ORDER — KETOROLAC TROMETHAMINE 30 MG/ML
15 INJECTION, SOLUTION INTRAMUSCULAR; INTRAVENOUS ONCE
Status: DISCONTINUED | OUTPATIENT
Start: 2025-07-31 | End: 2025-07-31

## 2025-07-31 RX ORDER — ONDANSETRON 4 MG/1
4 TABLET, ORALLY DISINTEGRATING ORAL EVERY 6 HOURS PRN
Qty: 10 TABLET | Refills: 0 | Status: SHIPPED | OUTPATIENT
Start: 2025-07-31

## 2025-07-31 RX ADMIN — METOCLOPRAMIDE 10 MG: 5 INJECTION, SOLUTION INTRAMUSCULAR; INTRAVENOUS at 13:58

## 2025-07-31 RX ADMIN — DROPERIDOL 0.62 MG: 2.5 INJECTION, SOLUTION INTRAMUSCULAR; INTRAVENOUS at 14:13

## 2025-07-31 RX ADMIN — DIPHENHYDRAMINE HYDROCHLORIDE 25 MG: 50 INJECTION, SOLUTION INTRAMUSCULAR; INTRAVENOUS at 14:16

## 2025-07-31 RX ADMIN — SODIUM CHLORIDE 1000 ML: 0.9 INJECTION, SOLUTION INTRAVENOUS at 14:11

## 2025-07-31 RX ADMIN — ONDANSETRON 4 MG: 2 INJECTION INTRAMUSCULAR; INTRAVENOUS at 14:04

## 2025-08-01 LAB
ATRIAL RATE: 60 BPM
P AXIS: 32 DEGREES
PR INTERVAL: 134 MS
QRS AXIS: 76 DEGREES
QRSD INTERVAL: 78 MS
QT INTERVAL: 434 MS
QTC INTERVAL: 434 MS
T WAVE AXIS: 66 DEGREES
VENTRICULAR RATE: 60 BPM

## 2025-08-01 PROCEDURE — 93010 ELECTROCARDIOGRAM REPORT: CPT | Performed by: INTERNAL MEDICINE

## 2025-08-20 ENCOUNTER — OFFICE VISIT (OUTPATIENT)
Dept: URGENT CARE | Facility: CLINIC | Age: 30
End: 2025-08-20
Payer: COMMERCIAL

## 2025-08-20 VITALS
OXYGEN SATURATION: 100 % | BODY MASS INDEX: 20.99 KG/M2 | HEART RATE: 58 BPM | SYSTOLIC BLOOD PRESSURE: 125 MMHG | RESPIRATION RATE: 16 BRPM | TEMPERATURE: 96.6 F | WEIGHT: 154.8 LBS | DIASTOLIC BLOOD PRESSURE: 78 MMHG

## 2025-08-20 DIAGNOSIS — T63.481A INSECT STINGS, ACCIDENTAL OR UNINTENTIONAL, INITIAL ENCOUNTER: Primary | ICD-10-CM

## 2025-08-20 PROCEDURE — 99213 OFFICE O/P EST LOW 20 MIN: CPT

## 2025-08-20 RX ORDER — PREDNISONE 20 MG/1
40 TABLET ORAL DAILY
Qty: 10 TABLET | Refills: 0 | Status: SHIPPED | OUTPATIENT
Start: 2025-08-20 | End: 2025-08-25